# Patient Record
Sex: FEMALE | Race: BLACK OR AFRICAN AMERICAN | NOT HISPANIC OR LATINO | Employment: FULL TIME | URBAN - METROPOLITAN AREA
[De-identification: names, ages, dates, MRNs, and addresses within clinical notes are randomized per-mention and may not be internally consistent; named-entity substitution may affect disease eponyms.]

---

## 2023-02-23 LAB
ALBUMIN (G/DL) IN SER/PLAS: 4.8 G/DL (ref 3.4–5)
ANION GAP IN SER/PLAS: 13 MMOL/L (ref 10–20)
CALCIUM (MG/DL) IN SER/PLAS: 10.7 MG/DL (ref 8.6–10.6)
CARBON DIOXIDE, TOTAL (MMOL/L) IN SER/PLAS: 29 MMOL/L (ref 21–32)
CHLORIDE (MMOL/L) IN SER/PLAS: 104 MMOL/L (ref 98–107)
CREATININE (MG/DL) IN SER/PLAS: 0.79 MG/DL (ref 0.5–1.05)
CREATININE (MG/DL) IN URINE: 99.5 MG/DL (ref 20–320)
ERYTHROCYTE DISTRIBUTION WIDTH (RATIO) BY AUTOMATED COUNT: 14.5 % (ref 11.5–14.5)
ERYTHROCYTE MEAN CORPUSCULAR HEMOGLOBIN CONCENTRATION (G/DL) BY AUTOMATED: 29.8 G/DL (ref 32–36)
ERYTHROCYTE MEAN CORPUSCULAR VOLUME (FL) BY AUTOMATED COUNT: 89 FL (ref 80–100)
ERYTHROCYTES (10*6/UL) IN BLOOD BY AUTOMATED COUNT: 4.6 X10E12/L (ref 4–5.2)
GFR FEMALE: 88 ML/MIN/1.73M2
GLUCOSE (MG/DL) IN SER/PLAS: 94 MG/DL (ref 74–99)
HEMATOCRIT (%) IN BLOOD BY AUTOMATED COUNT: 41 % (ref 36–46)
HEMOGLOBIN (G/DL) IN BLOOD: 12.2 G/DL (ref 12–16)
LEUKOCYTES (10*3/UL) IN BLOOD BY AUTOMATED COUNT: 8.6 X10E9/L (ref 4.4–11.3)
MAGNESIUM (MG/DL) IN SER/PLAS: 2.01 MG/DL (ref 1.6–2.4)
NRBC (PER 100 WBCS) BY AUTOMATED COUNT: 0 /100 WBC (ref 0–0)
PHOSPHATE (MG/DL) IN SER/PLAS: 4.1 MG/DL (ref 2.5–4.9)
PLATELETS (10*3/UL) IN BLOOD AUTOMATED COUNT: 515 X10E9/L (ref 150–450)
POTASSIUM (MMOL/L) IN SER/PLAS: 4.2 MMOL/L (ref 3.5–5.3)
PROTEIN (MG/DL) IN URINE: 11 MG/DL (ref 5–24)
PROTEIN/CREATININE (MG/MG) IN URINE: 0.11 MG/MG CREAT (ref 0–0.17)
SODIUM (MMOL/L) IN SER/PLAS: 142 MMOL/L (ref 136–145)
TACROLIMUS (NG/ML) IN BLOOD: 6.8 NG/ML (ref 2–15)
UREA NITROGEN (MG/DL) IN SER/PLAS: 9 MG/DL (ref 6–23)

## 2023-02-24 LAB
BK VIRUS LOG PCR: NORMAL LOG IU/ML
BK VIRUS PCR QUANT: NOT DETECTED IU/ML
CYTOMEGALOVIRUS DNA, PCR COMMENT: NORMAL
CYTOMEGALOVIRUS DNA, PCR IU/ML: NOT DETECTED IU/ML
CYTOMEGALOVIRUS DNA, PCR LOG IU/ML: NORMAL LOG IU/ML
EBV PCR WHOLE BLD LOG IU/ML: NORMAL LOG IU/ML
EBV PCR, QUANT, WHOLE BLOOD: NOT DETECTED IU/ML

## 2023-03-09 LAB
ALBUMIN (G/DL) IN SER/PLAS: 4.5 G/DL (ref 3.4–5)
ANION GAP IN SER/PLAS: 14 MMOL/L (ref 10–20)
CALCIDIOL (25 OH VITAMIN D3) (NG/ML) IN SER/PLAS: 35 NG/ML
CALCIUM (MG/DL) IN SER/PLAS: 10.5 MG/DL (ref 8.6–10.6)
CARBON DIOXIDE, TOTAL (MMOL/L) IN SER/PLAS: 28 MMOL/L (ref 21–32)
CHLORIDE (MMOL/L) IN SER/PLAS: 100 MMOL/L (ref 98–107)
CREATININE (MG/DL) IN SER/PLAS: 0.79 MG/DL (ref 0.5–1.05)
ERYTHROCYTE DISTRIBUTION WIDTH (RATIO) BY AUTOMATED COUNT: 14.5 % (ref 11.5–14.5)
ERYTHROCYTE MEAN CORPUSCULAR HEMOGLOBIN CONCENTRATION (G/DL) BY AUTOMATED: 30.5 G/DL (ref 32–36)
ERYTHROCYTE MEAN CORPUSCULAR VOLUME (FL) BY AUTOMATED COUNT: 88 FL (ref 80–100)
ERYTHROCYTES (10*6/UL) IN BLOOD BY AUTOMATED COUNT: 4.45 X10E12/L (ref 4–5.2)
GFR FEMALE: 88 ML/MIN/1.73M2
GLUCOSE (MG/DL) IN SER/PLAS: 99 MG/DL (ref 74–99)
HEMATOCRIT (%) IN BLOOD BY AUTOMATED COUNT: 39 % (ref 36–46)
HEMOGLOBIN (G/DL) IN BLOOD: 11.9 G/DL (ref 12–16)
LEUKOCYTES (10*3/UL) IN BLOOD BY AUTOMATED COUNT: 8.4 X10E9/L (ref 4.4–11.3)
MAGNESIUM (MG/DL) IN SER/PLAS: 1.75 MG/DL (ref 1.6–2.4)
NRBC (PER 100 WBCS) BY AUTOMATED COUNT: 0 /100 WBC (ref 0–0)
PHOSPHATE (MG/DL) IN SER/PLAS: 4.2 MG/DL (ref 2.5–4.9)
PLATELETS (10*3/UL) IN BLOOD AUTOMATED COUNT: 506 X10E9/L (ref 150–450)
POTASSIUM (MMOL/L) IN SER/PLAS: 4.3 MMOL/L (ref 3.5–5.3)
SODIUM (MMOL/L) IN SER/PLAS: 138 MMOL/L (ref 136–145)
TACROLIMUS (NG/ML) IN BLOOD: 6 NG/ML (ref 2–15)
UREA NITROGEN (MG/DL) IN SER/PLAS: 9 MG/DL (ref 6–23)

## 2023-03-10 LAB
EBV PCR PLASMA LOG IU/ML: NORMAL LOG IU/ML
EBV PCR, QUANT, PLASMA: NOT DETECTED IU/ML

## 2023-04-06 ENCOUNTER — APPOINTMENT (OUTPATIENT)
Dept: LAB | Facility: LAB | Age: 58
End: 2023-04-06
Payer: COMMERCIAL

## 2023-04-06 LAB
ALBUMIN (G/DL) IN SER/PLAS: 4.1 G/DL (ref 3.4–5)
ANION GAP IN SER/PLAS: 15 MMOL/L (ref 10–20)
CALCIUM (MG/DL) IN SER/PLAS: 9.8 MG/DL (ref 8.6–10.6)
CARBON DIOXIDE, TOTAL (MMOL/L) IN SER/PLAS: 26 MMOL/L (ref 21–32)
CHLORIDE (MMOL/L) IN SER/PLAS: 104 MMOL/L (ref 98–107)
CREATININE (MG/DL) IN SER/PLAS: 0.81 MG/DL (ref 0.5–1.05)
CREATININE (MG/DL) IN URINE: 77.8 MG/DL (ref 20–320)
ERYTHROCYTE DISTRIBUTION WIDTH (RATIO) BY AUTOMATED COUNT: 14.1 % (ref 11.5–14.5)
ERYTHROCYTE MEAN CORPUSCULAR HEMOGLOBIN CONCENTRATION (G/DL) BY AUTOMATED: 29.4 G/DL (ref 32–36)
ERYTHROCYTE MEAN CORPUSCULAR VOLUME (FL) BY AUTOMATED COUNT: 90 FL (ref 80–100)
ERYTHROCYTES (10*6/UL) IN BLOOD BY AUTOMATED COUNT: 4.21 X10E12/L (ref 4–5.2)
GFR FEMALE: 85 ML/MIN/1.73M2
GLUCOSE (MG/DL) IN SER/PLAS: 90 MG/DL (ref 74–99)
HEMATOCRIT (%) IN BLOOD BY AUTOMATED COUNT: 37.8 % (ref 36–46)
HEMOGLOBIN (G/DL) IN BLOOD: 11.1 G/DL (ref 12–16)
LEUKOCYTES (10*3/UL) IN BLOOD BY AUTOMATED COUNT: 8.3 X10E9/L (ref 4.4–11.3)
MAGNESIUM (MG/DL) IN SER/PLAS: 1.76 MG/DL (ref 1.6–2.4)
NRBC (PER 100 WBCS) BY AUTOMATED COUNT: 0 /100 WBC (ref 0–0)
PHOSPHATE (MG/DL) IN SER/PLAS: 4.2 MG/DL (ref 2.5–4.9)
PLATELETS (10*3/UL) IN BLOOD AUTOMATED COUNT: 464 X10E9/L (ref 150–450)
POTASSIUM (MMOL/L) IN SER/PLAS: 4.6 MMOL/L (ref 3.5–5.3)
PROTEIN (MG/DL) IN URINE: 9 MG/DL (ref 5–24)
PROTEIN/CREATININE (MG/MG) IN URINE: 0.12 MG/MG CREAT (ref 0–0.17)
SODIUM (MMOL/L) IN SER/PLAS: 140 MMOL/L (ref 136–145)
TACROLIMUS (NG/ML) IN BLOOD: 7.3 NG/ML (ref 2–15)
UREA NITROGEN (MG/DL) IN SER/PLAS: 15 MG/DL (ref 6–23)

## 2023-04-07 LAB
BK VIRUS LOG PCR: NORMAL LOG IU/ML
BK VIRUS PCR QUANT: NOT DETECTED IU/ML
CYTOMEGALOVIRUS DNA, PCR COMMENT: NORMAL
CYTOMEGALOVIRUS DNA, PCR IU/ML: NOT DETECTED IU/ML
CYTOMEGALOVIRUS DNA, PCR LOG IU/ML: NORMAL LOG IU/ML
EBV PCR PLASMA LOG IU/ML: NORMAL LOG IU/ML
EBV PCR, QUANT, PLASMA: NOT DETECTED IU/ML

## 2023-05-09 LAB
CREATININE (MG/DL) IN URINE: 84.1 MG/DL (ref 20–320)
EBV PCR PLASMA LOG IU/ML: NORMAL
EBV PCR, QUANT, PLASMA: NORMAL
PROTEIN (MG/DL) IN URINE: 15 MG/DL (ref 5–24)
PROTEIN/CREATININE (MG/MG) IN URINE: 0.18 MG/MG CREAT (ref 0–0.17)

## 2023-05-11 LAB
ALBUMIN (G/DL) IN SER/PLAS: 4.4 G/DL (ref 3.4–5)
ANION GAP IN SER/PLAS: 12 MMOL/L (ref 10–20)
CALCIUM (MG/DL) IN SER/PLAS: 10.4 MG/DL (ref 8.6–10.6)
CARBON DIOXIDE, TOTAL (MMOL/L) IN SER/PLAS: 28 MMOL/L (ref 21–32)
CHLORIDE (MMOL/L) IN SER/PLAS: 103 MMOL/L (ref 98–107)
CREATININE (MG/DL) IN SER/PLAS: 0.8 MG/DL (ref 0.5–1.05)
CREATININE (MG/DL) IN URINE: NORMAL
ERYTHROCYTE DISTRIBUTION WIDTH (RATIO) BY AUTOMATED COUNT: 13.7 % (ref 11.5–14.5)
ERYTHROCYTE MEAN CORPUSCULAR HEMOGLOBIN CONCENTRATION (G/DL) BY AUTOMATED: 30.6 G/DL (ref 32–36)
ERYTHROCYTE MEAN CORPUSCULAR VOLUME (FL) BY AUTOMATED COUNT: 87 FL (ref 80–100)
ERYTHROCYTES (10*6/UL) IN BLOOD BY AUTOMATED COUNT: 4.34 X10E12/L (ref 4–5.2)
GFR FEMALE: 87 ML/MIN/1.73M2
GLUCOSE (MG/DL) IN SER/PLAS: 103 MG/DL (ref 74–99)
HEMATOCRIT (%) IN BLOOD BY AUTOMATED COUNT: 37.9 % (ref 36–46)
HEMOGLOBIN (G/DL) IN BLOOD: 11.6 G/DL (ref 12–16)
LEUKOCYTES (10*3/UL) IN BLOOD BY AUTOMATED COUNT: 7.9 X10E9/L (ref 4.4–11.3)
MAGNESIUM (MG/DL) IN SER/PLAS: 1.78 MG/DL (ref 1.6–2.4)
NRBC (PER 100 WBCS) BY AUTOMATED COUNT: 0 /100 WBC (ref 0–0)
PHOSPHATE (MG/DL) IN SER/PLAS: 3.7 MG/DL (ref 2.5–4.9)
PLATELETS (10*3/UL) IN BLOOD AUTOMATED COUNT: 487 X10E9/L (ref 150–450)
POTASSIUM (MMOL/L) IN SER/PLAS: 4.3 MMOL/L (ref 3.5–5.3)
PROTEIN (MG/DL) IN URINE: NORMAL
PROTEIN/CREATININE (MG/MG) IN URINE: NORMAL
SODIUM (MMOL/L) IN SER/PLAS: 139 MMOL/L (ref 136–145)
TACROLIMUS (NG/ML) IN BLOOD: 5.9 NG/ML (ref 2–15)
UREA NITROGEN (MG/DL) IN SER/PLAS: 14 MG/DL (ref 6–23)

## 2023-05-12 LAB
BK VIRUS LOG PCR: NORMAL LOG IU/ML
BK VIRUS PCR QUANT: NOT DETECTED IU/ML
EBV PCR PLASMA LOG IU/ML: NORMAL LOG IU/ML
EBV PCR, QUANT, PLASMA: NOT DETECTED IU/ML

## 2023-06-15 ENCOUNTER — APPOINTMENT (OUTPATIENT)
Dept: LAB | Facility: LAB | Age: 58
End: 2023-06-15
Payer: COMMERCIAL

## 2023-06-15 LAB
ALBUMIN (G/DL) IN SER/PLAS: 4.4 G/DL (ref 3.4–5)
ANION GAP IN SER/PLAS: 13 MMOL/L (ref 10–20)
CALCIUM (MG/DL) IN SER/PLAS: 10.4 MG/DL (ref 8.6–10.6)
CARBON DIOXIDE, TOTAL (MMOL/L) IN SER/PLAS: 26 MMOL/L (ref 21–32)
CHLORIDE (MMOL/L) IN SER/PLAS: 105 MMOL/L (ref 98–107)
CREATININE (MG/DL) IN SER/PLAS: 0.9 MG/DL (ref 0.5–1.05)
ERYTHROCYTE DISTRIBUTION WIDTH (RATIO) BY AUTOMATED COUNT: 14.2 % (ref 11.5–14.5)
ERYTHROCYTE MEAN CORPUSCULAR HEMOGLOBIN CONCENTRATION (G/DL) BY AUTOMATED: 30.3 G/DL (ref 32–36)
ERYTHROCYTE MEAN CORPUSCULAR VOLUME (FL) BY AUTOMATED COUNT: 87 FL (ref 80–100)
ERYTHROCYTES (10*6/UL) IN BLOOD BY AUTOMATED COUNT: 4.32 X10E12/L (ref 4–5.2)
GFR FEMALE: 75 ML/MIN/1.73M2
GLUCOSE (MG/DL) IN SER/PLAS: 113 MG/DL (ref 74–99)
HEMATOCRIT (%) IN BLOOD BY AUTOMATED COUNT: 37.6 % (ref 36–46)
HEMOGLOBIN (G/DL) IN BLOOD: 11.4 G/DL (ref 12–16)
LEUKOCYTES (10*3/UL) IN BLOOD BY AUTOMATED COUNT: 6.7 X10E9/L (ref 4.4–11.3)
MAGNESIUM (MG/DL) IN SER/PLAS: 1.81 MG/DL (ref 1.6–2.4)
NRBC (PER 100 WBCS) BY AUTOMATED COUNT: 0 /100 WBC (ref 0–0)
PHOSPHATE (MG/DL) IN SER/PLAS: 4 MG/DL (ref 2.5–4.9)
PLATELETS (10*3/UL) IN BLOOD AUTOMATED COUNT: 461 X10E9/L (ref 150–450)
POTASSIUM (MMOL/L) IN SER/PLAS: 4.2 MMOL/L (ref 3.5–5.3)
SODIUM (MMOL/L) IN SER/PLAS: 140 MMOL/L (ref 136–145)
TACROLIMUS (NG/ML) IN BLOOD: 7.4 NG/ML (ref 2–15)
UREA NITROGEN (MG/DL) IN SER/PLAS: 11 MG/DL (ref 6–23)

## 2023-07-11 PROBLEM — H52.4 ASTIGMATISM OF BOTH EYES WITH PRESBYOPIA: Status: ACTIVE | Noted: 2023-07-11

## 2023-07-11 PROBLEM — D64.9 ANEMIA: Status: ACTIVE | Noted: 2023-07-11

## 2023-07-11 PROBLEM — E83.39 HYPOPHOSPHATEMIA: Status: ACTIVE | Noted: 2023-07-11

## 2023-07-11 PROBLEM — R79.89 INCREASED PLATELET COUNT: Status: ACTIVE | Noted: 2023-07-11

## 2023-07-11 PROBLEM — H52.203 ASTIGMATISM OF BOTH EYES WITH PRESBYOPIA: Status: ACTIVE | Noted: 2023-07-11

## 2023-07-11 PROBLEM — H52.03 HYPERMETROPIA OF BOTH EYES: Status: ACTIVE | Noted: 2023-07-11

## 2023-07-11 PROBLEM — D84.9 IMMUNOSUPPRESSION (MULTI): Status: ACTIVE | Noted: 2023-07-11

## 2023-07-11 PROBLEM — E78.5 DYSLIPIDEMIA: Status: ACTIVE | Noted: 2017-02-01

## 2023-07-11 PROBLEM — N25.81 SECONDARY HYPERPARATHYROIDISM, RENAL (MULTI): Status: ACTIVE | Noted: 2023-07-11

## 2023-07-11 PROBLEM — Z94.0 KIDNEY REPLACED BY TRANSPLANT (HHS-HCC): Status: ACTIVE | Noted: 2023-07-11

## 2023-07-11 PROBLEM — E83.42 HYPOMAGNESEMIA: Status: ACTIVE | Noted: 2023-07-11

## 2023-07-11 PROBLEM — H40.003 GLAUCOMA SUSPECT, BOTH EYES: Status: ACTIVE | Noted: 2023-07-11

## 2023-07-11 PROBLEM — K52.1 DIARRHEA DUE TO DRUG: Status: ACTIVE | Noted: 2023-07-11

## 2023-07-11 PROBLEM — D72.829 LEUKOCYTOSIS: Status: ACTIVE | Noted: 2023-07-11

## 2023-07-11 PROBLEM — N89.8 VAGINAL DISCHARGE: Status: ACTIVE | Noted: 2023-07-11

## 2023-07-11 PROBLEM — E55.9 VITAMIN D INSUFFICIENCY: Status: ACTIVE | Noted: 2023-07-11

## 2023-07-11 RX ORDER — AMLODIPINE BESYLATE 10 MG/1
10 TABLET ORAL DAILY
COMMUNITY
End: 2023-07-12 | Stop reason: SDUPTHER

## 2023-07-11 RX ORDER — TACROLIMUS 1 MG/1
1 CAPSULE ORAL EVERY 12 HOURS
COMMUNITY
End: 2024-03-19 | Stop reason: SDUPTHER

## 2023-07-11 RX ORDER — ASPIRIN 81 MG/1
1 TABLET ORAL DAILY
COMMUNITY
Start: 2022-03-18

## 2023-07-11 RX ORDER — MYCOPHENOLATE MOFETIL 250 MG/1
CAPSULE ORAL EVERY 12 HOURS
COMMUNITY
Start: 2022-03-09 | End: 2023-10-26 | Stop reason: ALTCHOICE

## 2023-07-11 RX ORDER — LANOLIN ALCOHOL/MO/W.PET/CERES
1 CREAM (GRAM) TOPICAL 2 TIMES DAILY
COMMUNITY
Start: 2023-06-03 | End: 2023-10-26 | Stop reason: SDUPTHER

## 2023-07-11 RX ORDER — PANTOPRAZOLE SODIUM 40 MG/1
1 TABLET, DELAYED RELEASE ORAL DAILY
COMMUNITY
Start: 2022-03-14

## 2023-07-11 RX ORDER — VENLAFAXINE 37.5 MG/1
1 TABLET ORAL DAILY
COMMUNITY
Start: 2022-10-26 | End: 2024-01-04 | Stop reason: ALTCHOICE

## 2023-07-11 RX ORDER — TACROLIMUS 0.5 MG/1
0.5 CAPSULE ORAL EVERY 12 HOURS
COMMUNITY
End: 2024-03-19 | Stop reason: SDUPTHER

## 2023-07-11 RX ORDER — DOCUSATE SODIUM 100 MG/1
CAPSULE, LIQUID FILLED ORAL 2 TIMES DAILY PRN
COMMUNITY
Start: 2022-03-14 | End: 2023-10-26 | Stop reason: ALTCHOICE

## 2023-07-11 RX ORDER — VALGANCICLOVIR 450 MG/1
TABLET, FILM COATED ORAL EVERY 24 HOURS
COMMUNITY
Start: 2022-03-14 | End: 2024-01-04 | Stop reason: ALTCHOICE

## 2023-07-11 RX ORDER — CARVEDILOL 6.25 MG/1
TABLET ORAL 2 TIMES DAILY
COMMUNITY
Start: 2022-05-12

## 2023-07-11 RX ORDER — PREDNISONE 5 MG/1
1 TABLET ORAL DAILY
COMMUNITY
Start: 2022-03-09 | End: 2023-10-26 | Stop reason: ALTCHOICE

## 2023-07-11 RX ORDER — ACETAMINOPHEN 500 MG
1 TABLET ORAL DAILY
COMMUNITY
Start: 2022-09-14 | End: 2023-10-26 | Stop reason: SDUPTHER

## 2023-07-11 RX ORDER — HYDRALAZINE HYDROCHLORIDE 25 MG/1
TABLET, FILM COATED ORAL EVERY 12 HOURS
COMMUNITY
Start: 2022-03-14 | End: 2023-10-26 | Stop reason: ALTCHOICE

## 2023-07-11 RX ORDER — ACETAMINOPHEN 325 MG/1
TABLET ORAL EVERY 6 HOURS PRN
COMMUNITY
Start: 2022-03-14

## 2023-07-12 ENCOUNTER — LAB (OUTPATIENT)
Dept: LAB | Facility: LAB | Age: 58
End: 2023-07-12
Payer: COMMERCIAL

## 2023-07-12 ENCOUNTER — OFFICE VISIT (OUTPATIENT)
Dept: PRIMARY CARE | Facility: CLINIC | Age: 58
End: 2023-07-12
Payer: COMMERCIAL

## 2023-07-12 VITALS
DIASTOLIC BLOOD PRESSURE: 70 MMHG | SYSTOLIC BLOOD PRESSURE: 112 MMHG | RESPIRATION RATE: 17 BRPM | WEIGHT: 157.2 LBS | TEMPERATURE: 97.7 F | HEIGHT: 59 IN | BODY MASS INDEX: 31.69 KG/M2 | HEART RATE: 86 BPM | OXYGEN SATURATION: 98 %

## 2023-07-12 DIAGNOSIS — I10 ESSENTIAL HYPERTENSION: Primary | ICD-10-CM

## 2023-07-12 DIAGNOSIS — I10 ESSENTIAL HYPERTENSION: ICD-10-CM

## 2023-07-12 DIAGNOSIS — E78.5 DYSLIPIDEMIA: ICD-10-CM

## 2023-07-12 LAB
CHOLESTEROL (MG/DL) IN SER/PLAS: 211 MG/DL (ref 0–199)
CHOLESTEROL IN HDL (MG/DL) IN SER/PLAS: 77.8 MG/DL
CHOLESTEROL IN LDL (MG/DL) IN SER/PLAS BY DIRECT ASSAY: 104 MG/DL (ref 0–129)
CHOLESTEROL/HDL RATIO: 2.7
NON-HDL CHOLESTEROL: 133 MG/DL
THYROTROPIN (MIU/L) IN SER/PLAS BY DETECTION LIMIT <= 0.05 MIU/L: 1 MIU/L (ref 0.44–3.98)

## 2023-07-12 PROCEDURE — 3074F SYST BP LT 130 MM HG: CPT | Performed by: FAMILY MEDICINE

## 2023-07-12 PROCEDURE — 3078F DIAST BP <80 MM HG: CPT | Performed by: FAMILY MEDICINE

## 2023-07-12 PROCEDURE — 84443 ASSAY THYROID STIM HORMONE: CPT

## 2023-07-12 PROCEDURE — 99213 OFFICE O/P EST LOW 20 MIN: CPT | Performed by: FAMILY MEDICINE

## 2023-07-12 PROCEDURE — 82465 ASSAY BLD/SERUM CHOLESTEROL: CPT

## 2023-07-12 PROCEDURE — 1036F TOBACCO NON-USER: CPT | Performed by: FAMILY MEDICINE

## 2023-07-12 PROCEDURE — 83718 ASSAY OF LIPOPROTEIN: CPT

## 2023-07-12 PROCEDURE — 83721 ASSAY OF BLOOD LIPOPROTEIN: CPT

## 2023-07-12 PROCEDURE — 36415 COLL VENOUS BLD VENIPUNCTURE: CPT

## 2023-07-12 RX ORDER — AMLODIPINE BESYLATE 10 MG/1
10 TABLET ORAL DAILY
Qty: 90 TABLET | Refills: 3 | Status: SHIPPED | OUTPATIENT
Start: 2023-07-12 | End: 2024-03-28

## 2023-07-12 ASSESSMENT — PATIENT HEALTH QUESTIONNAIRE - PHQ9
1. LITTLE INTEREST OR PLEASURE IN DOING THINGS: NOT AT ALL
SUM OF ALL RESPONSES TO PHQ9 QUESTIONS 1 AND 2: 0
2. FEELING DOWN, DEPRESSED OR HOPELESS: NOT AT ALL

## 2023-07-12 ASSESSMENT — LIFESTYLE VARIABLES: HOW MANY STANDARD DRINKS CONTAINING ALCOHOL DO YOU HAVE ON A TYPICAL DAY: PATIENT DOES NOT DRINK

## 2023-07-12 NOTE — PATIENT INSTRUCTIONS
HTN  - Your blood pressure is at goal today- goal is less than 130/80  - Continue your current medications  - Weight loss can help lower your bp!  Work on a healthy whole food diet and add at least 30min of exercise 5 days per week  - Work on a low salt diet     Please follow up in 6months for Wellness or as needed.       ** If labs or imaging ordered at today's visit, all the non-urgent results will be discussed at your next visit    If you have been referred for a special test or to a specialist please call  [phone] to schedule an appointment.  If you have any further questions, or if develop new or worsened symptoms, please give our office a call at [phone].

## 2023-07-12 NOTE — PROGRESS NOTES
"Subjective   Patient ID: Farzana De La Cruz is a 57 y.o. female who presents for Follow-up (Pt is here for HTN fuv.).    HPI     HTN  BP at goal today in office.  Using medications without issues.  Denies CP, SOB, palpitations, change in vision, dizziness, N/V.    Review of Systems  All systems reviewed and neg if not noted in the HPI above     Objective   /70 (Patient Position: Sitting)   Pulse 86   Temp 36.5 °C (97.7 °F)   Resp 17   Ht 1.499 m (4' 11\")   Wt 71.3 kg (157 lb 3.2 oz)   SpO2 98%   BMI 31.75 kg/m²     Physical Exam  Pleasant  Eyes: conjunctiva non-icteric and eye lids are without obvious rash or drooping. Pupils are symmetric.   Ears, Nose, Mouth, and Throat: External ears and nose appear to be without deformity or rash. No lesions or masses noted. Hearing is grossly intact.   CV: RRR, no murmur  Carotids: no bruits  Pulm:CTA B/L  Abd: soft, NTTP, + BS  LE: no edema  Psychiatric: Alert, orientation to person, place, and time. Recent/remote memory as evidenced through face-to-face interaction and discussion appear grossly intact. Mood and affect are normal.      Assessment/Plan   Problem List Items Addressed This Visit       Dyslipidemia    Relevant Orders    TSH with reflex to Free T4 if abnormal    Lipid Panel Non-Fasting    Cholesterol, LDL Direct    Essential hypertension - Primary    Relevant Medications    amLODIPine (Norvasc) 10 mg tablet    Other Relevant Orders    TSH with reflex to Free T4 if abnormal    Lipid Panel Non-Fasting    Cholesterol, LDL Direct     Please follow up in 6months for Wellness or as needed.      "

## 2023-08-17 LAB
ALBUMIN (G/DL) IN SER/PLAS: 4.3 G/DL (ref 3.4–5)
ANION GAP IN SER/PLAS: 12 MMOL/L (ref 10–20)
CALCIUM (MG/DL) IN SER/PLAS: 10 MG/DL (ref 8.6–10.6)
CARBON DIOXIDE, TOTAL (MMOL/L) IN SER/PLAS: 29 MMOL/L (ref 21–32)
CHLORIDE (MMOL/L) IN SER/PLAS: 104 MMOL/L (ref 98–107)
CREATININE (MG/DL) IN SER/PLAS: 0.84 MG/DL (ref 0.5–1.05)
CREATININE (MG/DL) IN URINE: 124 MG/DL (ref 20–320)
ERYTHROCYTE DISTRIBUTION WIDTH (RATIO) BY AUTOMATED COUNT: 15.1 % (ref 11.5–14.5)
ERYTHROCYTE MEAN CORPUSCULAR HEMOGLOBIN CONCENTRATION (G/DL) BY AUTOMATED: 29.4 G/DL (ref 32–36)
ERYTHROCYTE MEAN CORPUSCULAR VOLUME (FL) BY AUTOMATED COUNT: 90 FL (ref 80–100)
ERYTHROCYTES (10*6/UL) IN BLOOD BY AUTOMATED COUNT: 4.48 X10E12/L (ref 4–5.2)
GFR FEMALE: 82 ML/MIN/1.73M2
GLUCOSE (MG/DL) IN SER/PLAS: 98 MG/DL (ref 74–99)
HEMATOCRIT (%) IN BLOOD BY AUTOMATED COUNT: 40.2 % (ref 36–46)
HEMOGLOBIN (G/DL) IN BLOOD: 11.8 G/DL (ref 12–16)
LEUKOCYTES (10*3/UL) IN BLOOD BY AUTOMATED COUNT: 8 X10E9/L (ref 4.4–11.3)
MAGNESIUM (MG/DL) IN SER/PLAS: 1.95 MG/DL (ref 1.6–2.4)
NRBC (PER 100 WBCS) BY AUTOMATED COUNT: 0 /100 WBC (ref 0–0)
PHOSPHATE (MG/DL) IN SER/PLAS: 3.8 MG/DL (ref 2.5–4.9)
PLATELETS (10*3/UL) IN BLOOD AUTOMATED COUNT: 490 X10E9/L (ref 150–450)
POTASSIUM (MMOL/L) IN SER/PLAS: 4.5 MMOL/L (ref 3.5–5.3)
PROTEIN (MG/DL) IN URINE: 14 MG/DL (ref 5–24)
PROTEIN/CREATININE (MG/MG) IN URINE: 0.11 MG/MG CREAT (ref 0–0.17)
SODIUM (MMOL/L) IN SER/PLAS: 140 MMOL/L (ref 136–145)
TACROLIMUS (NG/ML) IN BLOOD: 11.2 NG/ML (ref 2–15)
UREA NITROGEN (MG/DL) IN SER/PLAS: 12 MG/DL (ref 6–23)

## 2023-08-18 LAB
BK VIRUS LOG PCR: NORMAL LOG IU/ML
BK VIRUS PCR QUANT: NOT DETECTED IU/ML

## 2023-08-21 LAB — TACROLIMUS (NG/ML) IN BLOOD: 6.9 NG/ML (ref 2–15)

## 2023-09-15 PROBLEM — D22.5 MELANOCYTIC NEVI OF TRUNK: Status: ACTIVE | Noted: 2023-08-02

## 2023-09-15 PROBLEM — L57.9 SKIN CHANGES DUE TO CHRONIC EXPOSURE TO NONIONIZING RADIATION, UNSPECIFIED: Status: ACTIVE | Noted: 2023-08-02

## 2023-09-15 PROBLEM — H40.059 OHT (OCULAR HYPERTENSION): Status: ACTIVE | Noted: 2023-09-15

## 2023-09-15 RX ORDER — LISINOPRIL AND HYDROCHLOROTHIAZIDE 12.5; 2 MG/1; MG/1
1 TABLET ORAL EVERY MORNING
COMMUNITY
Start: 2014-07-05 | End: 2023-10-26 | Stop reason: ALTCHOICE

## 2023-09-15 RX ORDER — HYDROCORTISONE 25 MG/G
CREAM TOPICAL
COMMUNITY
Start: 2023-08-02 | End: 2023-10-26 | Stop reason: ALTCHOICE

## 2023-10-05 ENCOUNTER — OFFICE VISIT (OUTPATIENT)
Dept: OPHTHALMOLOGY | Facility: CLINIC | Age: 58
End: 2023-10-05
Payer: COMMERCIAL

## 2023-10-05 DIAGNOSIS — H40.001 GLAUCOMA SUSPECT OF RIGHT EYE: Primary | ICD-10-CM

## 2023-10-05 DIAGNOSIS — H40.002 GLAUCOMA SUSPECT OF LEFT EYE: ICD-10-CM

## 2023-10-05 PROCEDURE — 76514 ECHO EXAM OF EYE THICKNESS: CPT | Performed by: OPHTHALMOLOGY

## 2023-10-05 PROCEDURE — 92020 GONIOSCOPY: CPT | Performed by: OPHTHALMOLOGY

## 2023-10-05 PROCEDURE — 99214 OFFICE O/P EST MOD 30 MIN: CPT | Performed by: OPHTHALMOLOGY

## 2023-10-05 ASSESSMENT — REFRACTION_WEARINGRX
OD_SPHERE: +1.75
OD_CYLINDER: -1.25
OD_ADD: +2.50
OS_ADD: +2.50
OD_AXIS: 080
OS_AXIS: 095
OS_CYLINDER: -1.50
OS_SPHERE: +1.75

## 2023-10-05 ASSESSMENT — VISUAL ACUITY
METHOD: SNELLEN - LINEAR
OS_CC: 20/20-1
OD_CC+: -2
OD_CC: 20/20

## 2023-10-05 ASSESSMENT — GONIOSCOPY
OD_INFERIOR: C40F 1+
OD_NASAL: C40F 1+
OS_SUPERIOR: C40F 1+
OS_INFERIOR: C40F 1+
OD_TEMPORAL: C40F 1+
OD_SUPERIOR: C40F 1+
OS_TEMPORAL: C40F 1+
OS_NASAL: C40F 1+

## 2023-10-05 ASSESSMENT — CUP TO DISC RATIO
OD_RATIO: 0.55
OS_RATIO: 0.65

## 2023-10-05 ASSESSMENT — EXTERNAL EXAM - LEFT EYE: OS_EXAM: NORMAL

## 2023-10-05 ASSESSMENT — ENCOUNTER SYMPTOMS
PSYCHIATRIC NEGATIVE: 0
ENDOCRINE NEGATIVE: 0
HEMATOLOGIC/LYMPHATIC NEGATIVE: 0
GASTROINTESTINAL NEGATIVE: 0
EYES NEGATIVE: 0
RESPIRATORY NEGATIVE: 0
ALLERGIC/IMMUNOLOGIC NEGATIVE: 0
NEUROLOGICAL NEGATIVE: 0
CARDIOVASCULAR NEGATIVE: 0
MUSCULOSKELETAL NEGATIVE: 0
CONSTITUTIONAL NEGATIVE: 0

## 2023-10-05 ASSESSMENT — TONOMETRY
OS_IOP_MMHG: 21
IOP_METHOD: GOLDMANN APPLANATION
OD_IOP_MMHG: 21

## 2023-10-05 ASSESSMENT — PACHYMETRY
OD_CT(UM): 573
OS_CT(UM): 608

## 2023-10-05 ASSESSMENT — EXTERNAL EXAM - RIGHT EYE: OD_EXAM: NORMAL

## 2023-10-05 ASSESSMENT — SLIT LAMP EXAM - LIDS
COMMENTS: NORMAL
COMMENTS: NORMAL

## 2023-10-05 NOTE — PROGRESS NOTES
History    Chief Complaint    Glaucoma       HPI       Glaucoma    In both eyes.  Vision is blurred.  Side effects of treatment include none.  Treatment compliance is always.             Comments    57 year old female present for Glaucoma NPV referred by Dr. Wells  pt highest tmax 25, pt has a family hx of Glaucoma Grandfather (maternal), pt denies any trauma to OU. Pt has been following Dr. Low for Glaucoma since 2021. Pt currently not using any gtts.          Last edited by Kelsey Wolf on 10/5/2023  3:24 PM.            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 11/24/2021    MRSA carrier    Chronic kidney disease, stage 4 (severe) (CMS/[facility]) 01/21/2022    CKD stage G4/A3, GFR 15-29 and albumin creatinine ratio >300 mg/g    Chronic kidney disease, stage 5 (CMS/[facility]) 04/07/2022    CKD (chronic kidney disease) stage 5, GFR less than 15 ml/min    Cramp and spasm 10/07/2020    Nocturnal muscle cramp    Dependence on renal dialysis (CMS/[facility]) 01/21/2022    Peritoneal dialysis status    Drug induced constipation 03/09/2022    Drug-induced constipation    Encounter for general adult medical examination without abnormal findings 07/25/2017    Well adult exam    Encounter for other preprocedural examination 03/09/2022    Pre-transplant evaluation for kidney transplant    Encounter for other preprocedural examination 03/09/2022    Preop testing    Encounter for other screening for malignant neoplasm of breast  2022    Breast cancer screening    Encounter for pregnancy test, result negative 2022    Urine pregnancy test negative    End stage renal disease (CMS/HCC) 2022    ESRD on dialysis    Essential (primary) hypertension 10/26/2022    Hypertension    Other acute postprocedural pain 2022    Post-operative pain    Other disorders of phosphorus metabolism 2021    Hyperphosphatemia    Other specified counseling 2018    Grief counseling    Pain in right leg 2019    Pain of right lower extremity    Personal history of other diseases of the digestive system 2022    History of constipation    Personal history of other medical treatment 06/10/2019    History of screening mammography    Shortness of breath 2022    SOB (shortness of breath) on exertion    Unspecified astigmatism, bilateral 2022    Astigmatism of both eyes     Past Surgical History:   Procedure Laterality Date     SECTION, CLASSIC  2018     Section    MOUTH SURGERY  2018    Oral Surgery Tooth Extraction    OTHER SURGICAL HISTORY  2017    Conclusion Of Operation Implants Mesh    OTHER SURGICAL HISTORY  2022    Kidney transplantation    TUBAL LIGATION  2018    Tubal Ligation     SOCIAL HISTORY   SMOKING:  reports that she has never smoked. She has never used smokeless tobacco.  DRUG USE:    reports no history of drug use.    FAMILY HISTORY  family history includes Glaucoma in her maternal grandfather.    CURRENT MEDICATIONS  No current outpatient medications on file. (Ophthalmology pharm classes)       Current Outpatient Medications (Other)   Medication Sig Dispense Refill    acetaminophen (Tylenol) 325 mg tablet Take by mouth every 6 hours if needed.      amLODIPine (Norvasc) 10 mg tablet Take 1 tablet (10 mg) by mouth once daily. as directed 90 tablet 3    aspirin 81 mg EC tablet Take 1 tablet (81 mg) by mouth once daily.      carvedilol (Coreg) 6.25 mg tablet  Take by mouth twice a day.      cholecalciferol (Vitamin D-3) 50 mcg (2,000 unit) capsule Take 1 capsule (50 mcg) by mouth once daily.      docusate sodium (Colace) 100 mg capsule Take by mouth 2 times a day as needed.      hydrALAZINE (Apresoline) 25 mg tablet Take by mouth every 12 hours.      hydrocortisone 2.5 % cream 1 Application      lisinopriL-hydrochlorothiazide 20-12.5 mg tablet Take 1 tablet by mouth once daily in the morning.      magnesium oxide (Mag-Ox) 400 mg (241.3 mg magnesium) tablet Take 1 tablet (400 mg) by mouth 2 times a day.      mycophenolate (Cellcept) 250 mg capsule Take by mouth every 12 hours.      mycophenolate (Cellcept) 250 mg capsule TAKE THREE (3) CAPSULES BY MOUTH EVERY 12 HOURS. 180 capsule 11    pantoprazole (ProtoNix) 40 mg EC tablet Take 1 tablet (40 mg) by mouth once daily.      predniSONE (Deltasone) 5 mg tablet Take 1 tablet (5 mg) by mouth once daily.      predniSONE (Deltasone) 5 mg tablet TAKE ONE (1) TABLET BY MOUTH ONCE DAILY. 30 tablet 11    tacrolimus (Prograf) 0.5 mg capsule Take 1 capsule (0.5 mg) by mouth every 12 hours.      tacrolimus (Prograf) 1 mg capsule Take 1 capsule (1 mg) by mouth every 12 hours.      valGANciclovir (Valcyte) 450 mg tablet Take by mouth once every 24 hours.      venlafaxine (Effexor) 37.5 mg tablet Take 1 tablet (37.5 mg) by mouth once daily.         Exam   Visual Acuity (Snellen - Linear)         Right Left    Dist cc 20/20 -2 20/20-1              Edited by: Kelsey Wolf              Wearing Rx       Wearing Rx         Sphere Cylinder Axis Add    Right +1.75 -1.25 080 +2.50    Left +1.75 -1.50 095 +2.50                  Not recorded       Not recorded       Intraocular pressure was 21 in the right eye and 21 in the left eye using Goldmann Applanation.  Not recorded       Pachymetry       Pachymetry (10/5/2023)         Right Left    Thickness 573 608                  Gonioscopy       Gonioscopy         Right Left    Temporal c40f 1+  "c40f 1+    Nasal c40f 1+ c40f 1+    Superior c40f 1+ c40f 1+    Inferior c40f 1+ c40f 1+                   External Exam         Right Left    External Normal Normal              Slit Lamp Exam         Right Left    Lids/Lashes Normal Normal    Conjunctiva/Sclera White and quiet White and quiet    Cornea Clear Clear    Anterior Chamber Deep and quiet Deep and quiet    Iris Round and reactive Round and reactive    Lens Clear Clear    Anterior Vitreous Normal Normal              Fundus Exam         Right Left    Disc Normal, even rims Normal, even rims    C/D Ratio 0.55 0.65    Macula Normal Normal    Vessels Normal Normal    Periphery Normal Normal                   <div id=\"MAIN_[email or web address]\"></div>       Diagnostics          Plan   -  The primary encounter diagnosis was Glaucoma suspect of right eye. A diagnosis of Glaucoma suspect of left eye was also pertinent to this visit.  -  Referred By:  No ref. provider found  -  IOP:  Last Tonometry OD 21 / OS 21 /Date 3:31 PM      Target OD: No Value exists for the : EPIC#QDC961 Target OS: No Value exists for the : EPIC#[id number]       Max pressure OD:   Date:         Max Pressure OS:   Date:    -    Gonioscopy       Gonioscopy         Right Left    Temporal c40f 1+ c40f 1+    Nasal c40f 1+ c40f 1+    Superior c40f 1+ c40f 1+    Inferior c40f 1+ c40f 1+                    -    Pachymetry       Pachymetry (10/5/2023)         Right Left    Thickness 573 608                  -  Pathophysiology of glaucoma, and potential blinding nature of disease reviewed.      Importance of follow up and compliance stressed  Extensive family hx on mothers side of glaucoma  Otherwise ROS negative   Patient IOP is borderline  Recent fields/oct rnfl without evidence of disease  Recommend monitoring off therapy    -  [facility] visual field (HVF) 24-2, dfe, oct rnfl      "

## 2023-10-12 ENCOUNTER — LAB (OUTPATIENT)
Dept: LAB | Facility: LAB | Age: 58
End: 2023-10-12
Payer: COMMERCIAL

## 2023-10-12 DIAGNOSIS — Z94.0 KIDNEY TRANSPLANT STATUS (HHS-HCC): Primary | ICD-10-CM

## 2023-10-12 LAB
ALBUMIN SERPL BCP-MCNC: 4.4 G/DL (ref 3.4–5)
ANION GAP SERPL CALC-SCNC: 12 MMOL/L (ref 10–20)
BUN SERPL-MCNC: 10 MG/DL (ref 6–23)
CALCIUM SERPL-MCNC: 10.1 MG/DL (ref 8.6–10.6)
CHLORIDE SERPL-SCNC: 103 MMOL/L (ref 98–107)
CO2 SERPL-SCNC: 29 MMOL/L (ref 21–32)
CREAT SERPL-MCNC: 0.78 MG/DL (ref 0.5–1.05)
ERYTHROCYTE [DISTWIDTH] IN BLOOD BY AUTOMATED COUNT: 14.5 % (ref 11.5–14.5)
GFR SERPL CREATININE-BSD FRML MDRD: 90 ML/MIN/1.73M*2
GLUCOSE SERPL-MCNC: 111 MG/DL (ref 74–99)
HCT VFR BLD AUTO: 39.5 % (ref 36–46)
HGB BLD-MCNC: 11.7 G/DL (ref 12–16)
MAGNESIUM SERPL-MCNC: 1.79 MG/DL (ref 1.6–2.4)
MCH RBC QN AUTO: 25.8 PG (ref 26–34)
MCHC RBC AUTO-ENTMCNC: 29.6 G/DL (ref 32–36)
MCV RBC AUTO: 87 FL (ref 80–100)
NRBC BLD-RTO: 0 /100 WBCS (ref 0–0)
PHOSPHATE SERPL-MCNC: 3.5 MG/DL (ref 2.5–4.9)
PLATELET # BLD AUTO: 481 X10*3/UL (ref 150–450)
PMV BLD AUTO: 11.3 FL (ref 7.5–11.5)
POTASSIUM SERPL-SCNC: 4.3 MMOL/L (ref 3.5–5.3)
RBC # BLD AUTO: 4.53 X10*6/UL (ref 4–5.2)
SODIUM SERPL-SCNC: 140 MMOL/L (ref 136–145)
TACROLIMUS BLD-MCNC: 8.4 NG/ML
WBC # BLD AUTO: 7.6 X10*3/UL (ref 4.4–11.3)

## 2023-10-12 PROCEDURE — 83735 ASSAY OF MAGNESIUM: CPT

## 2023-10-12 PROCEDURE — 85027 COMPLETE CBC AUTOMATED: CPT

## 2023-10-12 PROCEDURE — 80197 ASSAY OF TACROLIMUS: CPT

## 2023-10-12 PROCEDURE — 80069 RENAL FUNCTION PANEL: CPT

## 2023-10-12 PROCEDURE — 36415 COLL VENOUS BLD VENIPUNCTURE: CPT

## 2023-10-15 ENCOUNTER — DOCUMENTATION (OUTPATIENT)
Dept: TRANSPLANT | Facility: HOSPITAL | Age: 58
End: 2023-10-15
Payer: COMMERCIAL

## 2023-10-15 NOTE — PROGRESS NOTES
Late Entry:  Reviewed labs with Dr. Huang on 10/13/2023  Tac 8.4 from 6.9, 11.2 no recent med changes  Cr stable at 0.78   PLAN:  Continue to monitor

## 2023-10-16 LAB
ALLOSURE SCORE - KIDNEY: 0.12 %
CAREDX_ORDER_ID: NORMAL
CENTER_ORDER_ID: NORMAL
CLIENT SPECIMEN ID - ALLOSURE: NORMAL
DONOR RELATION - ALLOSURE: NORMAL
NOTES - ALLOSURE: NORMAL
RELATIVE CHANGE VALUE - KIDNEY: NORMAL %
TEST COMMENTS - ALLOSURE: NORMAL
TIME POST TX - ALLOSURE: NORMAL
TRANSPLANTED ORGAN - ALLOSURE: NORMAL
TX DATE - ALLOSURE/ALLOMAP: NORMAL
WP_ORDER_ID: NORMAL

## 2023-10-23 ENCOUNTER — SPECIALTY PHARMACY (OUTPATIENT)
Dept: PHARMACY | Facility: CLINIC | Age: 58
End: 2023-10-23

## 2023-10-23 ENCOUNTER — PHARMACY VISIT (OUTPATIENT)
Dept: PHARMACY | Facility: CLINIC | Age: 58
End: 2023-10-23
Payer: MEDICAID

## 2023-10-23 PROCEDURE — RXMED WILLOW AMBULATORY MEDICATION CHARGE

## 2023-10-26 ENCOUNTER — OFFICE VISIT (OUTPATIENT)
Dept: TRANSPLANT | Facility: HOSPITAL | Age: 58
End: 2023-10-26
Payer: COMMERCIAL

## 2023-10-26 ENCOUNTER — APPOINTMENT (OUTPATIENT)
Dept: TRANSPLANT | Facility: HOSPITAL | Age: 58
End: 2023-10-26
Payer: COMMERCIAL

## 2023-10-26 VITALS
OXYGEN SATURATION: 100 % | WEIGHT: 163.8 LBS | BODY MASS INDEX: 33.08 KG/M2 | HEART RATE: 85 BPM | TEMPERATURE: 97.9 F | DIASTOLIC BLOOD PRESSURE: 87 MMHG | SYSTOLIC BLOOD PRESSURE: 150 MMHG

## 2023-10-26 DIAGNOSIS — I10 ESSENTIAL HYPERTENSION: ICD-10-CM

## 2023-10-26 DIAGNOSIS — Z94.0 IMMUNOSUPPRESSIVE MANAGEMENT ENCOUNTER FOLLOWING KIDNEY TRANSPLANT (HHS-HCC): ICD-10-CM

## 2023-10-26 DIAGNOSIS — Z79.899 IMMUNOSUPPRESSIVE MANAGEMENT ENCOUNTER FOLLOWING KIDNEY TRANSPLANT (HHS-HCC): ICD-10-CM

## 2023-10-26 DIAGNOSIS — Z94.0 KIDNEY REPLACED BY TRANSPLANT (HHS-HCC): Primary | ICD-10-CM

## 2023-10-26 LAB
CREAT UR-MCNC: 33 MG/DL (ref 20–320)
CREAT UR-MCNC: 33.5 MG/DL (ref 20–320)
PROT UR-ACNC: <4 MG/DL (ref 5–24)
PROT/CREAT UR: ABNORMAL MG/G{CREAT}

## 2023-10-26 PROCEDURE — 82570 ASSAY OF URINE CREATININE: CPT | Performed by: INTERNAL MEDICINE

## 2023-10-26 PROCEDURE — 3077F SYST BP >= 140 MM HG: CPT | Performed by: INTERNAL MEDICINE

## 2023-10-26 PROCEDURE — 3079F DIAST BP 80-89 MM HG: CPT | Performed by: INTERNAL MEDICINE

## 2023-10-26 PROCEDURE — 99214 OFFICE O/P EST MOD 30 MIN: CPT | Performed by: INTERNAL MEDICINE

## 2023-10-26 PROCEDURE — 1036F TOBACCO NON-USER: CPT | Performed by: INTERNAL MEDICINE

## 2023-10-26 RX ORDER — LANOLIN ALCOHOL/MO/W.PET/CERES
1 CREAM (GRAM) TOPICAL 2 TIMES DAILY
Qty: 180 TABLET | Refills: 3 | Status: SHIPPED | OUTPATIENT
Start: 2023-10-26 | End: 2024-10-25

## 2023-10-26 RX ORDER — ACETAMINOPHEN 500 MG
2000 TABLET ORAL DAILY
Qty: 90 CAPSULE | Refills: 3 | Status: SHIPPED | OUTPATIENT
Start: 2023-10-26 | End: 2024-10-25

## 2023-10-26 ASSESSMENT — PAIN SCALES - GENERAL: PAINLEVEL: 0-NO PAIN

## 2023-11-01 NOTE — PROGRESS NOTES
TRANSPLANT NEPHROLOGY :   OUTPATIENT CLINIC NOTE      SERVICE DATE : 10/26/2023    REASON FOR VISIT/CHIEF COMPLAINT:  S/P  TRANSPLANT SURGERY  IMMUNOSUPPRESSIVE MEDICATION MANAGEMENT  BLOOD PRESSURE MANAGEMENT    HPI:    Ms. De La Cruz is a 57 y.o. female with past medical history significant for ESRD secondary to hypertensive nephrosclerosis s/p living donor kidney transplant on 3/8/2022. Patient's and donor EBV status is D negative/R positive and CMV status D-/R+. Patient was induced by Simulect and maintained on triple immunosuppression. Patient have spontaneous kidney function and did not require any dialysis post transplant her PD catheter removed along with the stent removal on 3/24/2022.     She had COVID on 12/13/22 and received Remdesivir daily. She has recovered fully 100 %. She just has some foggy brain. She has resumed usual dose of  mg bid.     Patient is here for follow up s/p kidney transplant.      Patient is doing well overall. No new complaints. Denied chest pain, SOB, SELBY, Palpitation. Normal urination and bowel movement. Normal gait and no weakness of arms/legs. No cough, runny nose, sore throat, cold symptoms, or rash. No hearing loss. Normal vision.No problems with his sleep, mood and function. No recent infection, hospitalization, surgery or ER visits.      ROS:  Review of  14 systems was performed system by system. See HPI. Otherwise, the symptoms were negative.    PAST MEDICAL HISTORY:  Past Medical History:   Diagnosis Date    Abnormal cytological findings in specimens from other organs, systems and tissues     Abnormal cytology    Adjustment disorder with depressed mood 12/05/2018    Grief    Allergy status to unspecified drugs, medicaments and biological substances     History of seasonal allergies    Atypical squamous cells of undetermined significance on cytologic smear of cervix (ASC-US) 03/06/2018    ASCUS with positive high risk HPV cervical    Carrier 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 carrier  of methicillin resistant Staphylococcus aureus 2021    MRSA carrier    Chronic kidney disease, stage 4 (severe) (CMS/HCC) 2022    CKD stage G4/A3, GFR 15-29 and albumin creatinine ratio >300 mg/g    Chronic kidney disease, stage 5 (CMS/HCC) 2022    CKD (chronic kidney disease) stage 5, GFR less than 15 ml/min    Cramp and spasm 10/07/2020    Nocturnal muscle cramp    Dependence on renal dialysis (CMS/HCC) 2022    Peritoneal dialysis status    Drug induced constipation 2022    Drug-induced constipation    Encounter for general adult medical examination without abnormal findings 2017    Well adult exam    Encounter for other preprocedural examination 2022    Pre-transplant evaluation for kidney transplant    Encounter for other preprocedural examination 2022    Preop testing    Encounter for other screening for malignant neoplasm of breast 2022    Breast cancer screening    Encounter for pregnancy test, result negative 2022    Urine pregnancy test negative    End stage renal disease (CMS/HCC) 2022    ESRD on dialysis    Essential (primary) hypertension 10/26/2022    Hypertension    Other acute postprocedural pain 2022    Post-operative pain    Other disorders of phosphorus metabolism 2021    Hyperphosphatemia    Other specified counseling 2018    Grief counseling    Pain in right leg 2019    Pain of right lower extremity    Personal history of other diseases of the digestive system 2022    History of constipation    Personal history of other medical treatment 06/10/2019    History of screening mammography    Shortness of breath 2022    SOB (shortness of breath) on exertion    Unspecified astigmatism, bilateral 2022    Astigmatism of both eyes        PAST SURGICAL HISTORY:  Past Surgical History:   Procedure Laterality Date     SECTION, CLASSIC  2018     Section    MOUTH SURGERY  2018     Oral Surgery Tooth Extraction    OTHER SURGICAL HISTORY  07/25/2017    Conclusion Of Operation Implants Mesh    OTHER SURGICAL HISTORY  05/13/2022    Kidney transplantation    TUBAL LIGATION  02/02/2018    Tubal Ligation        SOCIAL HISTORY:  Social History     Socioeconomic History    Marital status:      Spouse name: Not on file    Number of children: Not on file    Years of education: Not on file    Highest education level: Not on file   Occupational History    Not on file   Tobacco Use    Smoking status: Never    Smokeless tobacco: Never   Substance and Sexual Activity    Alcohol use: Never    Drug use: Never    Sexual activity: Not on file   Other Topics Concern    Not on file   Social History Narrative    Not on file     Social Determinants of Health     Financial Resource Strain: Not on file   Food Insecurity: Not on file   Transportation Needs: Not on file   Physical Activity: Not on file   Stress: Not on file   Social Connections: Not on file   Intimate Partner Violence: Not on file   Housing Stability: Not on file       FAMILY HISTORY:  Family History   Problem Relation Name Age of Onset    Glaucoma Maternal Grandfather         MEDICATION LIST:  Current Outpatient Medications   Medication Instructions    acetaminophen (Tylenol) 325 mg tablet oral, Every 6 hours PRN    amLODIPine (NORVASC) 10 mg, oral, Daily, as directed    aspirin 81 mg EC tablet 1 tablet, oral, Daily    carvedilol (Coreg) 6.25 mg tablet oral, 2 times daily    cholecalciferol (VITAMIN D-3) 2,000 Units, oral, Daily    magnesium oxide (MAG-OX) 400 mg, oral, 2 times daily    mycophenolate (Cellcept) 250 mg capsule TAKE THREE (3) CAPSULES BY MOUTH EVERY 12 HOURS.    pantoprazole (ProtoNix) 40 mg EC tablet 1 tablet, oral, Daily    predniSONE (Deltasone) 5 mg tablet TAKE ONE (1) TABLET BY MOUTH ONCE DAILY.    tacrolimus (PROGRAF) 1 mg, oral, Every 12 hours    tacrolimus (PROGRAF) 0.5 mg, oral, Every 12 hours    valGANciclovir  (Valcyte) 450 mg tablet oral, Every 24 hours    venlafaxine (Effexor) 37.5 mg tablet 1 tablet, oral, Daily         PHYSICAL EXAM:    Visit Vitals  /87   Pulse 85   Temp 36.6 °C (97.9 °F) (Temporal)   Wt 74.3 kg (163 lb 12.8 oz)   SpO2 100%   BMI 33.08 kg/m²   Smoking Status Never   BSA 1.76 m²          Vital signs - reviewed. Acceptable BP at this office visit.   General Appearance - NAD, Good speech, oriented and alert  HEENT - Supple. Not pale. No jaundice. No cervical lymphadenopathy. Pharynx and tonsils are not injected.  CVS - RRR. Normal S1/S2. No murmur, click , rub or gallop  Lungs- clear to auscultation bilaterally  Abdomen - soft , not tender, no guarding, no rigidity. No hepatosplenomegaly. Normal bowel sounds. No masses and ascites. S/P Kidney transplant .  Transplanted kidney is not tender.   Musculoskeletal /Extremities - no edema. Full ROM. No joint tenderness.   Neuro/Psych - appropriate mood and affect. Motor power V/V all extremities. CN I -XII were grossly intact.  Skin - No visible rash      LABS:    Lab Results   Component Value Date    WBC 7.6 10/12/2023    HGB 11.7 (L) 10/12/2023    HCT 39.5 10/12/2023     (H) 10/12/2023    CHOL 211 (H) 07/12/2023    TRIG 149 06/02/2022    HDL 77.8 07/12/2023    LDLDIRECT 104 07/12/2023    ALT 16 12/05/2022    AST 16 12/05/2022     10/12/2023    K 4.3 10/12/2023     10/12/2023    CREATININE 0.78 10/12/2023    BUN 10 10/12/2023    CO2 29 10/12/2023    TSH 1.00 07/12/2023    INR 1.0 03/08/2022    HGBA1C 5.5 12/13/2021     par    ASSESSMENT AND PLAN:    Ms. De La Cruz is a 57 y.o. female  who is here for follow up s/p kidney transplant.    TRANSPLANT DATE: 3/8/2022 (Kidney)      1. ESRD S/P kidney transplant   - Creatinine last check was :  Lab Results   Component Value Date    CREATININE 0.78 10/12/2023     Creatinine clearance cannot be calculated (Patient's most recent lab result is older than the maximum 7 days allowed.)    - Renal  allograft function is excellent.   -Random urine protein/creatinine ratio is due, ordered one today  -Allosure last check was 0.12%  -Ensure adequate hydration  - Avoid nephrotoxic medications, NSAIDs, and IV contrast.    2. Immunosuppression  -Tacrolimus level last check was 8.4  -Continue current immunosuppression regimen.    3. Electrolytes  Lab Results   Component Value Date    GLUCOSE 111 (H) 10/12/2023    CALCIUM 10.1 10/12/2023     10/12/2023    K 4.3 10/12/2023    CO2 29 10/12/2023     10/12/2023    BUN 10 10/12/2023    CREATININE 0.78 10/12/2023     -Acceptable from last lab drawn    4. Hypertension  Blood Pressures         10/26/2023  0903             BP: 150/87          -Home  BP had been acceptable  -Encourage to monitor home BP  -Continue current anti hypertensive medication    5. Bone Mineral Disease/Osteoporosis  -check VIT D, PTH with next lab  - Consider DEXA every 2-3 years , defer to PCP    6.Anemia  Lab Results   Component Value Date    WBC 7.6 10/12/2023    HGB 11.7 (L) 10/12/2023    HCT 39.5 10/12/2023    MCV 87 10/12/2023     (H) 10/12/2023     -asymptomatic  - Continue to monitor  -check iron studies and ferritin. Will consider DARIEN as needed.  - No indications for blood transfusion     7.Health maintenance and vaccination  - Flu shot during flu season annually  - Cancer screening is up to date per the patient    Lab : Routine transplant lab ( CBC, RFP, and anti-rejection trough level ), UPC ratio and allosure, BK PLASMA PCR  every 3 months  Additional labs:  VIT D, PTH with next lab      RTC 3-4  months    [facility]    Transplant Nephrology

## 2023-11-02 ENCOUNTER — OFFICE VISIT (OUTPATIENT)
Dept: OBSTETRICS AND GYNECOLOGY | Facility: CLINIC | Age: 58
End: 2023-11-02
Payer: COMMERCIAL

## 2023-11-02 VITALS
WEIGHT: 162 LBS | BODY MASS INDEX: 32.66 KG/M2 | SYSTOLIC BLOOD PRESSURE: 120 MMHG | HEIGHT: 59 IN | DIASTOLIC BLOOD PRESSURE: 76 MMHG

## 2023-11-02 DIAGNOSIS — Z01.419 ENCOUNTER FOR ANNUAL ROUTINE GYNECOLOGICAL EXAMINATION: Primary | ICD-10-CM

## 2023-11-02 PROCEDURE — 88175 CYTOPATH C/V AUTO FLUID REDO: CPT

## 2023-11-02 PROCEDURE — 87624 HPV HI-RISK TYP POOLED RSLT: CPT

## 2023-11-02 PROCEDURE — 3078F DIAST BP <80 MM HG: CPT | Performed by: OBSTETRICS & GYNECOLOGY

## 2023-11-02 PROCEDURE — 1036F TOBACCO NON-USER: CPT | Performed by: OBSTETRICS & GYNECOLOGY

## 2023-11-02 PROCEDURE — 99396 PREV VISIT EST AGE 40-64: CPT | Performed by: OBSTETRICS & GYNECOLOGY

## 2023-11-02 PROCEDURE — 3074F SYST BP LT 130 MM HG: CPT | Performed by: OBSTETRICS & GYNECOLOGY

## 2023-11-02 PROCEDURE — 88141 CYTOPATH C/V INTERPRET: CPT | Performed by: PATHOLOGY

## 2023-11-02 ASSESSMENT — PAIN SCALES - GENERAL: PAINLEVEL: 0-NO PAIN

## 2023-11-02 NOTE — PROGRESS NOTES
"57-year-old obese -3-0-3 -American woman presents today for annual GYN exam.  She has been amenorrheic with now more manageable menopause symptoms over the last year or 2.    The patient had a renal transplant in 2022.    Has a history of abnormal Pap smears.    - LGSIL with negative high risk HPV testing    - colposcopy negative    - ASCUS with negative high risk HPV testing    - negative Pap negative HPV testing    - colposcopy negative    - ASCUS with positive high risk HPV testing, HPV 16/18 were negative    GynHx: Menarche began at age 11.  She use to have monthly cycles.  She denies any STIs, PID or sexual abuse.  She is sexually active with 1 male partner, her .    OBHx:  section, followed by a  at 7 months (that child ).   x2 36 weeks with cerclages.       Objective   /76   Ht 1.499 m (4' 11\")   Wt 73.5 kg (162 lb)   BMI 32.72 kg/m²     General:   alert and oriented, in no acute distress, appears stated age, and cooperative   Heart: regular rate and rhythm, S1, S2 normal, no murmur, click, rub or gallop   Lungs: clear to auscultation bilaterally   Abdomen: soft, non-tender, without masses or organomegaly   Vulva: Bartholin's, Urethra, Thomas's normal   Vagina: normal mucosa, pH     Cervix: no cervical motion tenderness   Uterus: Exam limited by habitus and kidney, no clear masses noted   Adnexa: no mass, fullness, tenderness   Breast:     A/P: APE    -  Pap sent     -  Mammogram     -  PCP F/U prn   "

## 2023-11-02 NOTE — PATIENT INSTRUCTIONS
Thanks for coming in today for your annual GYN exam.    A Pap smear was sent.  Results should be available in the next few weeks. However, if you are unable to review the results by the end of the month please give the office a call.    Arrange to have a mammogram performed once a year.    Follow-up with your PCP and other healthcare specialist as needed.    Feel free to call the office with any problems, questions or concerns prior to your neck scheduled visit.

## 2023-11-15 LAB
CYTOLOGY CMNT CVX/VAG CYTO-IMP: NORMAL
HPV HR 12 DNA GENITAL QL NAA+PROBE: POSITIVE
HPV HR GENOTYPES PNL CVX NAA+PROBE: POSITIVE
HPV16 DNA SPEC QL NAA+PROBE: NEGATIVE
HPV18 DNA SPEC QL NAA+PROBE: NEGATIVE
LAB AP HPV GENOTYPE QUESTION: YES
LAB AP HPV HR: NORMAL
LAB AP PREVIOUS ABNORMAL HISTORY: NORMAL
LABORATORY COMMENT REPORT: NORMAL
MENSTRUAL HX REPORTED: NORMAL
PATH REPORT.TOTAL CANCER: NORMAL

## 2023-11-20 ENCOUNTER — PHARMACY VISIT (OUTPATIENT)
Dept: PHARMACY | Facility: CLINIC | Age: 58
End: 2023-11-20
Payer: MEDICAID

## 2023-11-20 ENCOUNTER — SPECIALTY PHARMACY (OUTPATIENT)
Dept: PHARMACY | Facility: CLINIC | Age: 58
End: 2023-11-20

## 2023-11-20 PROCEDURE — RXMED WILLOW AMBULATORY MEDICATION CHARGE

## 2023-11-21 ENCOUNTER — PHARMACY VISIT (OUTPATIENT)
Dept: PHARMACY | Facility: CLINIC | Age: 58
End: 2023-11-21
Payer: MEDICAID

## 2023-11-21 PROCEDURE — RXMED WILLOW AMBULATORY MEDICATION CHARGE

## 2023-11-28 ENCOUNTER — TELEPHONE (OUTPATIENT)
Dept: OBSTETRICS AND GYNECOLOGY | Facility: CLINIC | Age: 58
End: 2023-11-28
Payer: COMMERCIAL

## 2023-11-28 NOTE — TELEPHONE ENCOUNTER
Pt verified by name and .  Pt is aware Dr. Sol recommends a colp.  Nurse scheduled pt for colp on 2024 at 2:30 pm.  Pt has no questions or concerns at this time.

## 2023-12-18 ENCOUNTER — APPOINTMENT (OUTPATIENT)
Dept: RADIOLOGY | Facility: CLINIC | Age: 58
End: 2023-12-18
Payer: COMMERCIAL

## 2023-12-18 PROCEDURE — RXMED WILLOW AMBULATORY MEDICATION CHARGE

## 2023-12-19 ENCOUNTER — PHARMACY VISIT (OUTPATIENT)
Dept: PHARMACY | Facility: CLINIC | Age: 58
End: 2023-12-19
Payer: MEDICAID

## 2024-01-04 ENCOUNTER — OFFICE VISIT (OUTPATIENT)
Dept: PRIMARY CARE | Facility: CLINIC | Age: 59
End: 2024-01-04
Payer: COMMERCIAL

## 2024-01-04 ENCOUNTER — LAB (OUTPATIENT)
Dept: LAB | Facility: LAB | Age: 59
End: 2024-01-04
Payer: COMMERCIAL

## 2024-01-04 VITALS
DIASTOLIC BLOOD PRESSURE: 70 MMHG | BODY MASS INDEX: 32.74 KG/M2 | TEMPERATURE: 97.8 F | HEIGHT: 59 IN | HEART RATE: 88 BPM | OXYGEN SATURATION: 99 % | SYSTOLIC BLOOD PRESSURE: 116 MMHG | RESPIRATION RATE: 16 BRPM | WEIGHT: 162.4 LBS

## 2024-01-04 DIAGNOSIS — D84.9 IMMUNODEFICIENCY, UNSPECIFIED (MULTI): ICD-10-CM

## 2024-01-04 DIAGNOSIS — N25.81 SECONDARY HYPERPARATHYROIDISM, RENAL (MULTI): ICD-10-CM

## 2024-01-04 DIAGNOSIS — I10 ESSENTIAL HYPERTENSION: Primary | ICD-10-CM

## 2024-01-04 DIAGNOSIS — Z94.0 KIDNEY REPLACED BY TRANSPLANT (HHS-HCC): ICD-10-CM

## 2024-01-04 DIAGNOSIS — Z23 NEED FOR VACCINATION: ICD-10-CM

## 2024-01-04 LAB
ALBUMIN SERPL BCP-MCNC: 4.4 G/DL (ref 3.4–5)
ANION GAP SERPL CALC-SCNC: 15 MMOL/L (ref 10–20)
BUN SERPL-MCNC: 12 MG/DL (ref 6–23)
CALCIUM SERPL-MCNC: 10.2 MG/DL (ref 8.6–10.6)
CHLORIDE SERPL-SCNC: 105 MMOL/L (ref 98–107)
CO2 SERPL-SCNC: 27 MMOL/L (ref 21–32)
CREAT SERPL-MCNC: 0.89 MG/DL (ref 0.5–1.05)
CREAT UR-MCNC: 96.3 MG/DL (ref 20–320)
ERYTHROCYTE [DISTWIDTH] IN BLOOD BY AUTOMATED COUNT: 14.5 % (ref 11.5–14.5)
GFR SERPL CREATININE-BSD FRML MDRD: 76 ML/MIN/1.73M*2
GLUCOSE SERPL-MCNC: 129 MG/DL (ref 74–99)
HCT VFR BLD AUTO: 40 % (ref 36–46)
HGB BLD-MCNC: 11.9 G/DL (ref 12–16)
MCH RBC QN AUTO: 26.3 PG (ref 26–34)
MCHC RBC AUTO-ENTMCNC: 29.8 G/DL (ref 32–36)
MCV RBC AUTO: 88 FL (ref 80–100)
NRBC BLD-RTO: 0 /100 WBCS (ref 0–0)
PHOSPHATE SERPL-MCNC: 3.6 MG/DL (ref 2.5–4.9)
PLATELET # BLD AUTO: 482 X10*3/UL (ref 150–450)
POTASSIUM SERPL-SCNC: 4.6 MMOL/L (ref 3.5–5.3)
PROT UR-ACNC: 11 MG/DL (ref 5–24)
PROT/CREAT UR: 0.11 MG/MG CREAT (ref 0–0.17)
RBC # BLD AUTO: 4.53 X10*6/UL (ref 4–5.2)
SODIUM SERPL-SCNC: 142 MMOL/L (ref 136–145)
TACROLIMUS BLD-MCNC: 8.4 NG/ML
WBC # BLD AUTO: 7.6 X10*3/UL (ref 4.4–11.3)

## 2024-01-04 PROCEDURE — 90472 IMMUNIZATION ADMIN EACH ADD: CPT | Performed by: FAMILY MEDICINE

## 2024-01-04 PROCEDURE — 84156 ASSAY OF PROTEIN URINE: CPT

## 2024-01-04 PROCEDURE — 80197 ASSAY OF TACROLIMUS: CPT

## 2024-01-04 PROCEDURE — 1036F TOBACCO NON-USER: CPT | Performed by: FAMILY MEDICINE

## 2024-01-04 PROCEDURE — 90715 TDAP VACCINE 7 YRS/> IM: CPT | Performed by: FAMILY MEDICINE

## 2024-01-04 PROCEDURE — 85027 COMPLETE CBC AUTOMATED: CPT

## 2024-01-04 PROCEDURE — 90471 IMMUNIZATION ADMIN: CPT | Performed by: FAMILY MEDICINE

## 2024-01-04 PROCEDURE — 90677 PCV20 VACCINE IM: CPT | Performed by: FAMILY MEDICINE

## 2024-01-04 PROCEDURE — 80069 RENAL FUNCTION PANEL: CPT

## 2024-01-04 PROCEDURE — 3078F DIAST BP <80 MM HG: CPT | Performed by: FAMILY MEDICINE

## 2024-01-04 PROCEDURE — 36415 COLL VENOUS BLD VENIPUNCTURE: CPT

## 2024-01-04 PROCEDURE — 3074F SYST BP LT 130 MM HG: CPT | Performed by: FAMILY MEDICINE

## 2024-01-04 PROCEDURE — 99396 PREV VISIT EST AGE 40-64: CPT | Performed by: FAMILY MEDICINE

## 2024-01-04 PROCEDURE — 87799 DETECT AGENT NOS DNA QUANT: CPT

## 2024-01-04 PROCEDURE — 82570 ASSAY OF URINE CREATININE: CPT

## 2024-01-04 ASSESSMENT — PATIENT HEALTH QUESTIONNAIRE - PHQ9
SUM OF ALL RESPONSES TO PHQ9 QUESTIONS 1 AND 2: 0
2. FEELING DOWN, DEPRESSED OR HOPELESS: NOT AT ALL
1. LITTLE INTEREST OR PLEASURE IN DOING THINGS: NOT AT ALL

## 2024-01-04 NOTE — PROGRESS NOTES
"Subjective   Patient ID: Farzana De La Cruz is a 58 y.o. female who presents for Annual Exam.    HPI     Here for a physical  Does not want a chaperone.     Other concerns/issues/followup:  1 - HTN  BP at goal today in office.  Using medications without issues.  Denies CP, SOB, palpitations, change in vision, dizziness, N/V.      PMSFH was reviewed & updated.     ---Social---  Job:  [employer] working from home  :   Kids: 3  Likes/hobbies:  reading and movies     ETOH - none  Drugs - none  Tobacco - none   Review of Systems  All systems reviewed and neg if not noted in the HPI above    Objective   /70 (Patient Position: Sitting)   Pulse 88   Temp 36.6 °C (97.8 °F)   Resp 16   Ht 1.499 m (4' 11\")   Wt 73.7 kg (162 lb 6.4 oz)   SpO2 99%   BMI 32.80 kg/m²     Physical Exam  Pleasant  Eyes: conjunctiva non-icteric and eye lids are without obvious rash or drooping. Pupils are symmetric.   Ears, Nose, Mouth, and Throat: External ears and nose appear to be without deformity or rash. No lesions or masses noted. Hearing is grossly intact.   CV: RRR, no murmur  Carotids: no bruits  Pulm:CTA B/L  Abd: soft, NTTP, + BS  LE: no edema  Psychiatric: Alert, orientation to person, place, and time. Recent/remote memory as evidenced through face-to-face interaction and discussion appear grossly intact. Mood and affect are normal.       Assessment/Plan   Problem List Items Addressed This Visit             ICD-10-CM    Essential hypertension - Primary  - Your blood pressure is at goal today- goal is less than 130/80  - Continue your current medications  - Weight loss can help lower your bp!  Work on a healthy whole food diet and add at least 30min of exercise 5 days per week  - Work on a low salt diet   I10    Immunosuppression (CMS/HCC) D84.9     Continue meds per kidney team  Stable         Secondary hyperparathyroidism, renal (CMS/HCC) N25.81     Improved from 2022  Recheckinglabs today         Relevant Orders "    PTH, intact     Other Visit Diagnoses         Codes    Need for vaccination     Z23    Relevant Orders    Pneumococcal conjugate vaccine, 20-valent (PREVNAR 20)    Tdap vaccine, age 7 years and older              Please follow up in JULY for HTN or as needed.

## 2024-01-05 LAB
BKV DNA SERPL NAA+PROBE-LOG#: NORMAL {LOG_COPIES}/ML
LABORATORY COMMENT REPORT: NOT DETECTED

## 2024-01-17 ENCOUNTER — PROCEDURE VISIT (OUTPATIENT)
Dept: OBSTETRICS AND GYNECOLOGY | Facility: CLINIC | Age: 59
End: 2024-01-17
Payer: COMMERCIAL

## 2024-01-17 VITALS
WEIGHT: 162 LBS | DIASTOLIC BLOOD PRESSURE: 80 MMHG | SYSTOLIC BLOOD PRESSURE: 140 MMHG | HEIGHT: 59 IN | BODY MASS INDEX: 32.66 KG/M2

## 2024-01-17 DIAGNOSIS — R87.619 ABNORMAL CERVICAL PAPANICOLAOU SMEAR, UNSPECIFIED ABNORMAL PAP FINDING: Primary | ICD-10-CM

## 2024-01-17 RX ORDER — UBIDECARENONE 75 MG
CAPSULE ORAL
COMMUNITY

## 2024-01-17 NOTE — PROGRESS NOTES
58-year-old obese -3-0-3 -American woman presents today for follow up. She has a history of abnormal Paps:     - LGSIL Pap, +HR HPV, 16/18 negative     - LGSIL with negative high risk HPV testing    - colposcopy negative    - ASCUS with negative high risk HPV testing    - negative Pap negative HPV testing    - colposcopy negative    - ASCUS with positive high risk HPV testing, 16/18 negative    [facility]P recommendation is for repeat Pap in 1 year - 2023.   Thus visit cancelled. Pt to RTC 2024 for repeat Pap w/HPV testing.

## 2024-01-21 PROCEDURE — RXMED WILLOW AMBULATORY MEDICATION CHARGE

## 2024-01-22 PROCEDURE — RXMED WILLOW AMBULATORY MEDICATION CHARGE

## 2024-01-23 ENCOUNTER — PHARMACY VISIT (OUTPATIENT)
Dept: PHARMACY | Facility: CLINIC | Age: 59
End: 2024-01-23
Payer: MEDICAID

## 2024-02-07 ENCOUNTER — LAB (OUTPATIENT)
Dept: LAB | Facility: LAB | Age: 59
End: 2024-02-07
Payer: COMMERCIAL

## 2024-02-07 DIAGNOSIS — N25.81 SECONDARY HYPERPARATHYROIDISM, RENAL (MULTI): ICD-10-CM

## 2024-02-07 DIAGNOSIS — Z94.0 KIDNEY REPLACED BY TRANSPLANT (HHS-HCC): ICD-10-CM

## 2024-02-07 LAB — PTH-INTACT SERPL-MCNC: 143.4 PG/ML (ref 18.5–88)

## 2024-02-07 PROCEDURE — 83970 ASSAY OF PARATHORMONE: CPT

## 2024-02-07 PROCEDURE — 36415 COLL VENOUS BLD VENIPUNCTURE: CPT

## 2024-02-09 LAB
ALLOSURE SCORE - KIDNEY: 0.14 %
CAREDX_ORDER_ID: NORMAL
CENTER_ORDER_ID: NORMAL
CLIENT SPECIMEN ID - ALLOSURE: NORMAL
DONOR RELATION - ALLOSURE: NORMAL
NOTES - ALLOSURE: NORMAL
RELATIVE CHANGE VALUE - KIDNEY: NORMAL %
TEST COMMENTS - ALLOSURE: NORMAL
TIME POST TX - ALLOSURE: NORMAL
TRANSPLANTED ORGAN - ALLOSURE: NORMAL
TX DATE - ALLOSURE/ALLOMAP: NORMAL
WP_ORDER_ID: NORMAL

## 2024-02-12 DIAGNOSIS — Z94.0 KIDNEY REPLACED BY TRANSPLANT (HHS-HCC): ICD-10-CM

## 2024-02-12 DIAGNOSIS — E21.0 HYPERPARATHYROID BONE DISEASE (MULTI): ICD-10-CM

## 2024-02-15 PROCEDURE — RXMED WILLOW AMBULATORY MEDICATION CHARGE

## 2024-02-20 ENCOUNTER — SPECIALTY PHARMACY (OUTPATIENT)
Dept: PHARMACY | Facility: CLINIC | Age: 59
End: 2024-02-20

## 2024-02-21 ENCOUNTER — PHARMACY VISIT (OUTPATIENT)
Dept: PHARMACY | Facility: CLINIC | Age: 59
End: 2024-02-21
Payer: MEDICAID

## 2024-02-29 ENCOUNTER — OFFICE VISIT (OUTPATIENT)
Dept: TRANSPLANT | Facility: HOSPITAL | Age: 59
End: 2024-02-29
Payer: COMMERCIAL

## 2024-02-29 ENCOUNTER — LAB (OUTPATIENT)
Dept: LAB | Facility: LAB | Age: 59
End: 2024-02-29
Payer: COMMERCIAL

## 2024-02-29 VITALS
OXYGEN SATURATION: 100 % | DIASTOLIC BLOOD PRESSURE: 83 MMHG | TEMPERATURE: 97.5 F | WEIGHT: 161.3 LBS | SYSTOLIC BLOOD PRESSURE: 139 MMHG | BODY MASS INDEX: 32.58 KG/M2 | HEART RATE: 92 BPM

## 2024-02-29 DIAGNOSIS — Z94.0 KIDNEY REPLACED BY TRANSPLANT (HHS-HCC): ICD-10-CM

## 2024-02-29 DIAGNOSIS — R63.5 WEIGHT GAIN DUE TO MEDICATION: ICD-10-CM

## 2024-02-29 DIAGNOSIS — Z79.899 IMMUNOSUPPRESSIVE MANAGEMENT ENCOUNTER FOLLOWING KIDNEY TRANSPLANT (HHS-HCC): ICD-10-CM

## 2024-02-29 DIAGNOSIS — E55.9 VITAMIN D DEFICIENCY: ICD-10-CM

## 2024-02-29 DIAGNOSIS — E78.5 DYSLIPIDEMIA: ICD-10-CM

## 2024-02-29 DIAGNOSIS — T50.905A WEIGHT GAIN DUE TO MEDICATION: ICD-10-CM

## 2024-02-29 DIAGNOSIS — I10 ESSENTIAL HYPERTENSION: ICD-10-CM

## 2024-02-29 DIAGNOSIS — Z94.0 KIDNEY REPLACED BY TRANSPLANT (HHS-HCC): Primary | ICD-10-CM

## 2024-02-29 DIAGNOSIS — Z94.0 IMMUNOSUPPRESSIVE MANAGEMENT ENCOUNTER FOLLOWING KIDNEY TRANSPLANT (HHS-HCC): ICD-10-CM

## 2024-02-29 LAB
25(OH)D3 SERPL-MCNC: 34 NG/ML (ref 30–100)
ALBUMIN SERPL BCP-MCNC: 4.8 G/DL (ref 3.4–5)
ANION GAP SERPL CALC-SCNC: 15 MMOL/L (ref 10–20)
BUN SERPL-MCNC: 11 MG/DL (ref 6–23)
CALCIUM SERPL-MCNC: 10.2 MG/DL (ref 8.6–10.6)
CHLORIDE SERPL-SCNC: 102 MMOL/L (ref 98–107)
CHOLEST SERPL-MCNC: 192 MG/DL (ref 0–199)
CHOLESTEROL/HDL RATIO: 2.6
CO2 SERPL-SCNC: 26 MMOL/L (ref 21–32)
CREAT SERPL-MCNC: 0.7 MG/DL (ref 0.5–1.05)
CREAT UR-MCNC: 47.1 MG/DL (ref 20–320)
EGFRCR SERPLBLD CKD-EPI 2021: >90 ML/MIN/1.73M*2
ERYTHROCYTE [DISTWIDTH] IN BLOOD BY AUTOMATED COUNT: 14.3 % (ref 11.5–14.5)
EST. AVERAGE GLUCOSE BLD GHB EST-MCNC: 143 MG/DL
GLUCOSE SERPL-MCNC: 106 MG/DL (ref 74–99)
HBA1C MFR BLD: 6.6 %
HCT VFR BLD AUTO: 40.2 % (ref 36–46)
HDLC SERPL-MCNC: 74.6 MG/DL
HGB BLD-MCNC: 12.2 G/DL (ref 12–16)
LDLC SERPL CALC-MCNC: 95 MG/DL
MAGNESIUM SERPL-MCNC: 1.84 MG/DL (ref 1.6–2.4)
MCH RBC QN AUTO: 26.2 PG (ref 26–34)
MCHC RBC AUTO-ENTMCNC: 30.3 G/DL (ref 32–36)
MCV RBC AUTO: 87 FL (ref 80–100)
NON HDL CHOLESTEROL: 117 MG/DL (ref 0–149)
NRBC BLD-RTO: 0 /100 WBCS (ref 0–0)
PHOSPHATE SERPL-MCNC: 3.5 MG/DL (ref 2.5–4.9)
PLATELET # BLD AUTO: 486 X10*3/UL (ref 150–450)
POTASSIUM SERPL-SCNC: 4.2 MMOL/L (ref 3.5–5.3)
PROT UR-ACNC: 4 MG/DL (ref 5–24)
PROT/CREAT UR: 0.08 MG/MG CREAT (ref 0–0.17)
PTH-INTACT SERPL-MCNC: 170.2 PG/ML (ref 18.5–88)
RBC # BLD AUTO: 4.65 X10*6/UL (ref 4–5.2)
SODIUM SERPL-SCNC: 139 MMOL/L (ref 136–145)
TACROLIMUS BLD-MCNC: 6.4 NG/ML
TRIGL SERPL-MCNC: 111 MG/DL (ref 0–149)
VLDL: 22 MG/DL (ref 0–40)
WBC # BLD AUTO: 7.9 X10*3/UL (ref 4.4–11.3)

## 2024-02-29 PROCEDURE — 80061 LIPID PANEL: CPT

## 2024-02-29 PROCEDURE — 83735 ASSAY OF MAGNESIUM: CPT

## 2024-02-29 PROCEDURE — 83970 ASSAY OF PARATHORMONE: CPT

## 2024-02-29 PROCEDURE — 83036 HEMOGLOBIN GLYCOSYLATED A1C: CPT

## 2024-02-29 PROCEDURE — 87799 DETECT AGENT NOS DNA QUANT: CPT

## 2024-02-29 PROCEDURE — 1036F TOBACCO NON-USER: CPT | Performed by: INTERNAL MEDICINE

## 2024-02-29 PROCEDURE — 85027 COMPLETE CBC AUTOMATED: CPT

## 2024-02-29 PROCEDURE — 82570 ASSAY OF URINE CREATININE: CPT

## 2024-02-29 PROCEDURE — 3079F DIAST BP 80-89 MM HG: CPT | Performed by: INTERNAL MEDICINE

## 2024-02-29 PROCEDURE — 84156 ASSAY OF PROTEIN URINE: CPT

## 2024-02-29 PROCEDURE — 82306 VITAMIN D 25 HYDROXY: CPT

## 2024-02-29 PROCEDURE — 80197 ASSAY OF TACROLIMUS: CPT

## 2024-02-29 PROCEDURE — 80069 RENAL FUNCTION PANEL: CPT

## 2024-02-29 PROCEDURE — 36415 COLL VENOUS BLD VENIPUNCTURE: CPT

## 2024-02-29 PROCEDURE — 99214 OFFICE O/P EST MOD 30 MIN: CPT | Performed by: INTERNAL MEDICINE

## 2024-02-29 PROCEDURE — 3075F SYST BP GE 130 - 139MM HG: CPT | Performed by: INTERNAL MEDICINE

## 2024-02-29 ASSESSMENT — PAIN SCALES - GENERAL: PAINLEVEL: 0-NO PAIN

## 2024-02-29 NOTE — PATIENT INSTRUCTIONS
Referral placed for weight management, Dr. Valentín Zheng  Labs today with BK and then every 3 months  Return to see us: 6 months   Increase Vit D to twice a day

## 2024-02-29 NOTE — PROGRESS NOTES
TRANSPLANT NEPHROLOGY :   OUTPATIENT CLINIC NOTE      SERVICE DATE : 02/29/2024    REASON FOR VISIT/CHIEF COMPLAINT:  S/P  TRANSPLANT SURGERY  IMMUNOSUPPRESSIVE MEDICATION MANAGEMENT  BLOOD PRESSURE MANAGEMENT    HPI:    Ms. De La Cruz is a 58 y.o. female with past medical history significant for  ESRD secondary to hypertensive nephrosclerosis s/p living donor kidney transplant on 3/8/2022. Patient's and donor EBV status is D negative/R positive and CMV status D-/R+. Patient was induced by Simulect and maintained on triple immunosuppression. Patient have spontaneous kidney function and did not require any dialysis post transplant her PD catheter removed along with the stent removal on 3/24/2022.      Patient is here for follow up s/p kidney transplant.    She just saw OB-gyne for abnormal PAP smear.     Patient is doing well overall. No new complaints. Denied chest pain, SOB, SELBY, Palpitation. Normal urination and bowel movement. Normal gait and no weakness of arms/legs. No cough, runny nose, sore throat, cold symptoms, or rash. No hearing loss. Normal vision.No problems with his sleep, mood and function. No recent infection, hospitalization, surgery or ER visits.      ROS:  Review of  14 systems was performed system by system. See HPI. Otherwise, the symptoms were negative.    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 11/24/2021    MRSA carrier    Chronic kidney disease, stage 4 (severe)  (CMS/HCC) 2022    CKD stage G4/A3, GFR 15-29 and albumin creatinine ratio >300 mg/g    Chronic kidney disease, stage 5 (CMS/HCC) 2022    CKD (chronic kidney disease) stage 5, GFR less than 15 ml/min    Cramp and spasm 10/07/2020    Nocturnal muscle cramp    Dependence on renal dialysis (CMS/HCC) 2022    Peritoneal dialysis status    Drug induced constipation 2022    Drug-induced constipation    Encounter for general adult medical examination without abnormal findings 2017    Well adult exam    Encounter for other preprocedural examination 2022    Pre-transplant evaluation for kidney transplant    Encounter for other preprocedural examination 2022    Preop testing    Encounter for other screening for malignant neoplasm of breast 2022    Breast cancer screening    Encounter for pregnancy test, result negative 2022    Urine pregnancy test negative    End stage renal disease (CMS/HCC) 2022    ESRD on dialysis    Essential (primary) hypertension 10/26/2022    Hypertension    Other acute postprocedural pain 2022    Post-operative pain    Other disorders of phosphorus metabolism 2021    Hyperphosphatemia    Other specified counseling 2018    Grief counseling    Pain in right leg 2019    Pain of right lower extremity    Personal history of other diseases of the digestive system 2022    History of constipation    Personal history of other medical treatment 06/10/2019    History of screening mammography    Shortness of breath 2022    SOB (shortness of breath) on exertion    Unspecified astigmatism, bilateral 2022    Astigmatism of both eyes        PAST SURGICAL HISTORY:  Past Surgical History:   Procedure Laterality Date     SECTION, CLASSIC  2018     Section    MOUTH SURGERY  2018    Oral Surgery Tooth Extraction    OTHER SURGICAL HISTORY  2017    Conclusion Of Operation Implants Mesh     OTHER SURGICAL HISTORY  05/13/2022    Kidney transplantation    TUBAL LIGATION  02/02/2018    Tubal Ligation        SOCIAL HISTORY:  Social History     Socioeconomic History    Marital status:      Spouse name: Not on file    Number of children: Not on file    Years of education: Not on file    Highest education level: Not on file   Occupational History    Not on file   Tobacco Use    Smoking status: Never    Smokeless tobacco: Never   Substance and Sexual Activity    Alcohol use: Never    Drug use: Never    Sexual activity: Not on file   Other Topics Concern    Not on file   Social History Narrative    Not on file     Social Determinants of Health     Financial Resource Strain: Not on file   Food Insecurity: Not on file   Transportation Needs: Not on file   Physical Activity: Not on file   Stress: Not on file   Social Connections: Not on file   Intimate Partner Violence: Not on file   Housing Stability: Not on file       FAMILY HISTORY:  Family History   Problem Relation Name Age of Onset    Glaucoma Maternal Grandfather         MEDICATION LIST:  Current Outpatient Medications   Medication Instructions    acetaminophen (Tylenol) 325 mg tablet oral, Every 6 hours PRN    amLODIPine (NORVASC) 10 mg, oral, Daily, as directed    aspirin 81 mg EC tablet 1 tablet, oral, Daily    carvedilol (Coreg) 6.25 mg tablet oral, 2 times daily    cholecalciferol (VITAMIN D-3) 2,000 Units, oral, Daily    cyanocobalamin (Vitamin B-12) 500 mcg tablet oral    magnesium oxide (MAG-OX) 400 mg, oral, 2 times daily    mycophenolate (Cellcept) 250 mg capsule TAKE THREE (3) CAPSULES BY MOUTH EVERY 12 HOURS.    pantoprazole (ProtoNix) 40 mg EC tablet 1 tablet, oral, Daily    predniSONE (Deltasone) 5 mg tablet TAKE ONE (1) TABLET BY MOUTH ONCE DAILY.    tacrolimus (PROGRAF) 1 mg, oral, Every 12 hours    tacrolimus (PROGRAF) 0.5 mg, oral, Every 12 hours       ALLERGY  Allergies   Allergen Reactions    Egg Unknown    Latex Itching, Rash and  Unknown    Senna Other     cramps       PHYSICAL EXAM:    Visit Vitals  /83   Pulse 92   Temp 36.4 °C (97.5 °F) (Temporal)   Wt 73.2 kg (161 lb 4.8 oz)   SpO2 100%   BMI 32.58 kg/m²   OB Status Postmenopausal   Smoking Status Never   BSA 1.75 m²          Vital signs - reviewed. Acceptable BP at this office visit.   General Appearance - NAD, Good speech, oriented and alert  HEENT - Supple. Not pale. No jaundice. No cervical lymphadenopathy. Pharynx and tonsils are not injected.  CVS - RRR. Normal S1/S2. No murmur, click , rub or gallop  Lungs- clear to auscultation bilaterally  Abdomen - soft , not tender, no guarding, no rigidity. No hepatosplenomegaly. Normal bowel sounds. No masses and ascites. S/P Kidney transplant .  Transplanted kidney is not tender.   Musculoskeletal /Extremities - no edema. Full ROM. No joint tenderness.   Neuro/Psych - appropriate mood and affect. Motor power V/V all extremities. CN I -XII were grossly intact.  Skin - No visible rash      LABS:    Lab Results   Component Value Date    WBC 7.6 01/04/2024    HGB 11.9 (L) 01/04/2024    HCT 40.0 01/04/2024     (H) 01/04/2024    CHOL 211 (H) 07/12/2023    TRIG 149 06/02/2022    HDL 77.8 07/12/2023    LDLDIRECT 104 07/12/2023    ALT 16 12/05/2022    AST 16 12/05/2022     01/04/2024    K 4.6 01/04/2024     01/04/2024    CREATININE 0.89 01/04/2024    BUN 12 01/04/2024    CO2 27 01/04/2024    TSH 1.00 07/12/2023    INR 1.0 03/08/2022    HGBA1C 5.5 12/13/2021     par    ASSESSMENT AND PLAN:    Ms. De La Cruz is a 58 y.o. female  who is here for follow up s/p kidney transplant.    TRANSPLANT DATE: 3/8/2022 (Kidney)      1. ESRD S/P kidney transplant   - Creatinine last check was :  Lab Results   Component Value Date    CREATININE 0.89 01/04/2024     Creatinine clearance cannot be calculated (Patient's most recent lab result is older than the maximum 7 days allowed.)    - Renal allograft function is excellent  -   -Allosure last check  was NORMAL  -Ensure adequate hydration  - Avoid nephrotoxic medications, NSAIDs, and IV contrast.    2. Immunosuppression  -Continue current immunosuppression regimen.    3. Electrolytes  Lab Results   Component Value Date    GLUCOSE 129 (H) 01/04/2024    CALCIUM 10.2 01/04/2024     01/04/2024    K 4.6 01/04/2024    CO2 27 01/04/2024     01/04/2024    BUN 12 01/04/2024    CREATININE 0.89 01/04/2024     -Acceptable from last lab drawn    4. Hypertension  Blood Pressures         2/29/2024  0835             BP: 139/83          -Home  BP had been acceptable  -Encourage to monitor home BP  -Continue current anti hypertensive medication    5. Bone Mineral Disease/Osteoporosis  Lab Results   Component Value Date    .4 (H) 02/07/2024    CALCIUM 10.2 01/04/2024    PHOS 3.6 01/04/2024    VITD25 35 03/09/2023     -check VIT D, PTH with next lab  - Consider DEXA every 2-3 years , defer to PCP    6.Anemia  Lab Results   Component Value Date    WBC 7.6 01/04/2024    HGB 11.9 (L) 01/04/2024    HCT 40.0 01/04/2024    MCV 88 01/04/2024     (H) 01/04/2024     -asymptomatic  - Continue to monitor  -check iron studies and ferritin. Will consider DARIEN as needed.  - No indications for blood transfusion     7.Health maintenance and vaccination  - Flu shot during flu season annually  - Cancer screening is up to date per the patient      Additional Plan :  Increase vitamin D to bid  Lab today; then q 3 months  Took tacrolimus last night at 8:30 pm  Adding Lipid, A1C and BK PCR per protocol  RTC 6 month(s)  Refer for weight management Dr Laura  Will see PCP next month for vaccine, cancer screening, health maintenance    Danette Garrido    Transplant Nephrology

## 2024-03-01 ENCOUNTER — DOCUMENTATION (OUTPATIENT)
Dept: TRANSPLANT | Facility: HOSPITAL | Age: 59
End: 2024-03-01
Payer: COMMERCIAL

## 2024-03-01 LAB
BKV DNA SERPL NAA+PROBE-LOG#: NORMAL {LOG_COPIES}/ML
LABORATORY COMMENT REPORT: NOT DETECTED

## 2024-03-01 NOTE — PROGRESS NOTES
Lab Review with Dr. Dewey  PTH - 170.2, 143.4, 124.5   Vit-D 34, 35, 18 - On Vit-D 2000 IU daily   Ca 10.2 x 2 10.1   -  Continue to monitor

## 2024-03-08 ENCOUNTER — HOSPITAL ENCOUNTER (OUTPATIENT)
Dept: RADIOLOGY | Facility: CLINIC | Age: 59
Discharge: HOME | End: 2024-03-08
Payer: COMMERCIAL

## 2024-03-08 VITALS — BODY MASS INDEX: 32.46 KG/M2 | HEIGHT: 59 IN | WEIGHT: 161 LBS

## 2024-03-08 DIAGNOSIS — Z01.419 ENCOUNTER FOR ANNUAL ROUTINE GYNECOLOGICAL EXAMINATION: ICD-10-CM

## 2024-03-08 PROCEDURE — 77063 BREAST TOMOSYNTHESIS BI: CPT | Performed by: RADIOLOGY

## 2024-03-08 PROCEDURE — 77067 SCR MAMMO BI INCL CAD: CPT | Performed by: RADIOLOGY

## 2024-03-08 PROCEDURE — 77067 SCR MAMMO BI INCL CAD: CPT

## 2024-03-19 ENCOUNTER — TELEPHONE (OUTPATIENT)
Dept: TRANSPLANT | Facility: HOSPITAL | Age: 59
End: 2024-03-19
Payer: COMMERCIAL

## 2024-03-19 DIAGNOSIS — Z94.0 KIDNEY REPLACED BY TRANSPLANT (HHS-HCC): ICD-10-CM

## 2024-03-19 RX ORDER — TACROLIMUS 0.5 MG/1
0.5 CAPSULE ORAL EVERY 12 HOURS
Qty: 60 CAPSULE | Refills: 11 | Status: SHIPPED | OUTPATIENT
Start: 2024-03-19 | End: 2025-03-19

## 2024-03-19 RX ORDER — TACROLIMUS 1 MG/1
1 CAPSULE ORAL EVERY 12 HOURS
Qty: 60 CAPSULE | Refills: 11 | Status: SHIPPED | OUTPATIENT
Start: 2024-03-19 | End: 2025-03-19

## 2024-03-19 NOTE — TELEPHONE ENCOUNTER
Called patient back, confirmed tac dose of 1.5 mg twice daily, and sent to [facility]

## 2024-03-21 ENCOUNTER — SPECIALTY PHARMACY (OUTPATIENT)
Dept: PHARMACY | Facility: CLINIC | Age: 59
End: 2024-03-21

## 2024-03-21 PROCEDURE — RXMED WILLOW AMBULATORY MEDICATION CHARGE

## 2024-03-25 ENCOUNTER — PHARMACY VISIT (OUTPATIENT)
Dept: PHARMACY | Facility: CLINIC | Age: 59
End: 2024-03-25
Payer: COMMERCIAL

## 2024-03-25 PROCEDURE — RXMED WILLOW AMBULATORY MEDICATION CHARGE

## 2024-03-28 DIAGNOSIS — I10 ESSENTIAL HYPERTENSION: ICD-10-CM

## 2024-03-28 RX ORDER — AMLODIPINE BESYLATE 10 MG/1
10 TABLET ORAL DAILY
Qty: 90 TABLET | Refills: 0 | Status: SHIPPED | OUTPATIENT
Start: 2024-03-28

## 2024-04-17 ENCOUNTER — SPECIALTY PHARMACY (OUTPATIENT)
Dept: PHARMACY | Facility: CLINIC | Age: 59
End: 2024-04-17

## 2024-04-17 PROCEDURE — RXMED WILLOW AMBULATORY MEDICATION CHARGE

## 2024-04-18 ENCOUNTER — PHARMACY VISIT (OUTPATIENT)
Dept: PHARMACY | Facility: CLINIC | Age: 59
End: 2024-04-18
Payer: COMMERCIAL

## 2024-04-22 ENCOUNTER — LAB (OUTPATIENT)
Dept: LAB | Facility: LAB | Age: 59
End: 2024-04-22
Payer: COMMERCIAL

## 2024-04-22 DIAGNOSIS — Z94.0 KIDNEY REPLACED BY TRANSPLANT (HHS-HCC): ICD-10-CM

## 2024-04-22 DIAGNOSIS — E21.0 HYPERPARATHYROID BONE DISEASE (MULTI): ICD-10-CM

## 2024-04-22 LAB
25(OH)D3 SERPL-MCNC: 38 NG/ML (ref 30–100)
ALBUMIN SERPL BCP-MCNC: 4.2 G/DL (ref 3.4–5)
ANION GAP SERPL CALC-SCNC: 15 MMOL/L (ref 10–20)
BUN SERPL-MCNC: 11 MG/DL (ref 6–23)
CALCIUM SERPL-MCNC: 10 MG/DL (ref 8.6–10.6)
CHLORIDE SERPL-SCNC: 104 MMOL/L (ref 98–107)
CO2 SERPL-SCNC: 27 MMOL/L (ref 21–32)
CREAT SERPL-MCNC: 0.78 MG/DL (ref 0.5–1.05)
CREAT UR-MCNC: 59.2 MG/DL (ref 20–320)
EGFRCR SERPLBLD CKD-EPI 2021: 89 ML/MIN/1.73M*2
ERYTHROCYTE [DISTWIDTH] IN BLOOD BY AUTOMATED COUNT: 14.7 % (ref 11.5–14.5)
GLUCOSE SERPL-MCNC: 109 MG/DL (ref 74–99)
HCT VFR BLD AUTO: 39.9 % (ref 36–46)
HGB BLD-MCNC: 11.8 G/DL (ref 12–16)
MCH RBC QN AUTO: 26 PG (ref 26–34)
MCHC RBC AUTO-ENTMCNC: 29.6 G/DL (ref 32–36)
MCV RBC AUTO: 88 FL (ref 80–100)
NRBC BLD-RTO: 0 /100 WBCS (ref 0–0)
PHOSPHATE SERPL-MCNC: 3.4 MG/DL (ref 2.5–4.9)
PLATELET # BLD AUTO: 471 X10*3/UL (ref 150–450)
POTASSIUM SERPL-SCNC: 4.6 MMOL/L (ref 3.5–5.3)
PROT UR-ACNC: 9 MG/DL (ref 5–24)
PROT/CREAT UR: 0.15 MG/MG CREAT (ref 0–0.17)
PTH-INTACT SERPL-MCNC: 176.9 PG/ML (ref 18.5–88)
RBC # BLD AUTO: 4.54 X10*6/UL (ref 4–5.2)
SODIUM SERPL-SCNC: 141 MMOL/L (ref 136–145)
WBC # BLD AUTO: 6.8 X10*3/UL (ref 4.4–11.3)

## 2024-04-22 PROCEDURE — 85027 COMPLETE CBC AUTOMATED: CPT

## 2024-04-22 PROCEDURE — 36415 COLL VENOUS BLD VENIPUNCTURE: CPT

## 2024-04-22 PROCEDURE — 84156 ASSAY OF PROTEIN URINE: CPT

## 2024-04-22 PROCEDURE — 87799 DETECT AGENT NOS DNA QUANT: CPT

## 2024-04-22 PROCEDURE — 82570 ASSAY OF URINE CREATININE: CPT

## 2024-04-22 PROCEDURE — 83970 ASSAY OF PARATHORMONE: CPT

## 2024-04-22 PROCEDURE — 82306 VITAMIN D 25 HYDROXY: CPT

## 2024-04-22 PROCEDURE — 80069 RENAL FUNCTION PANEL: CPT

## 2024-04-22 PROCEDURE — 80197 ASSAY OF TACROLIMUS: CPT

## 2024-04-23 LAB
BKV DNA SERPL NAA+PROBE-LOG#: NORMAL {LOG_COPIES}/ML
LABORATORY COMMENT REPORT: NOT DETECTED
TACROLIMUS BLD-MCNC: 7.6 NG/ML

## 2024-04-24 LAB
ALLOSURE SCORE - KIDNEY: 0.15 %
CAREDX_ORDER_ID: NORMAL
CENTER_ORDER_ID: NORMAL
CLIENT SPECIMEN ID - ALLOSURE: NORMAL
DONOR RELATION - ALLOSURE: NORMAL
NOTES - ALLOSURE: NORMAL
RELATIVE CHANGE VALUE - KIDNEY: NORMAL %
TEST COMMENTS - ALLOSURE: NORMAL
TIME POST TX - ALLOSURE: NORMAL
TRANSPLANTED ORGAN - ALLOSURE: NORMAL
TX DATE - ALLOSURE/ALLOMAP: NORMAL
WP_ORDER_ID: NORMAL

## 2024-05-07 DIAGNOSIS — D84.9 IMMUNOSUPPRESSION (MULTI): Primary | ICD-10-CM

## 2024-05-15 RX ORDER — PREDNISONE 5 MG/1
5 TABLET ORAL DAILY
Qty: 30 TABLET | Refills: 11 | Status: SHIPPED | OUTPATIENT
Start: 2024-05-15 | End: 2025-05-15

## 2024-05-15 RX ORDER — MYCOPHENOLATE MOFETIL 250 MG/1
750 CAPSULE ORAL EVERY 12 HOURS
Qty: 180 CAPSULE | Refills: 11 | Status: SHIPPED | OUTPATIENT
Start: 2024-05-15 | End: 2025-05-15

## 2024-05-16 PROCEDURE — RXMED WILLOW AMBULATORY MEDICATION CHARGE

## 2024-05-20 ENCOUNTER — SPECIALTY PHARMACY (OUTPATIENT)
Dept: PHARMACY | Facility: CLINIC | Age: 59
End: 2024-05-20

## 2024-05-20 PROCEDURE — RXMED WILLOW AMBULATORY MEDICATION CHARGE

## 2024-05-21 ENCOUNTER — PHARMACY VISIT (OUTPATIENT)
Dept: PHARMACY | Facility: CLINIC | Age: 59
End: 2024-05-21
Payer: COMMERCIAL

## 2024-05-23 ENCOUNTER — PATIENT MESSAGE (OUTPATIENT)
Dept: PRIMARY CARE | Facility: CLINIC | Age: 59
End: 2024-05-23
Payer: COMMERCIAL

## 2024-05-23 NOTE — TELEPHONE ENCOUNTER
From: Farzana De La Cruz  To: Alexia Baptiste  Sent: 5/23/2024 12:11 PM EDT  Subject: Hip pain    Hello Dr Baptiste  The past couple of days I have been having pain in my right hip. I have been taking Tylenol but it not working. Is there anything else I can take? Thank you

## 2024-06-19 ENCOUNTER — SPECIALTY PHARMACY (OUTPATIENT)
Dept: PHARMACY | Facility: CLINIC | Age: 59
End: 2024-06-19

## 2024-06-19 PROCEDURE — RXMED WILLOW AMBULATORY MEDICATION CHARGE

## 2024-06-20 ENCOUNTER — PHARMACY VISIT (OUTPATIENT)
Dept: PHARMACY | Facility: CLINIC | Age: 59
End: 2024-06-20
Payer: COMMERCIAL

## 2024-06-24 DIAGNOSIS — I10 ESSENTIAL HYPERTENSION: ICD-10-CM

## 2024-06-24 RX ORDER — AMLODIPINE BESYLATE 10 MG/1
10 TABLET ORAL DAILY
Qty: 90 TABLET | Refills: 3 | Status: SHIPPED | OUTPATIENT
Start: 2024-06-24

## 2024-07-16 ENCOUNTER — APPOINTMENT (OUTPATIENT)
Dept: PRIMARY CARE | Facility: CLINIC | Age: 59
End: 2024-07-16
Payer: COMMERCIAL

## 2024-07-16 VITALS
OXYGEN SATURATION: 100 % | WEIGHT: 160 LBS | BODY MASS INDEX: 32.25 KG/M2 | HEIGHT: 59 IN | HEART RATE: 78 BPM | DIASTOLIC BLOOD PRESSURE: 82 MMHG | RESPIRATION RATE: 15 BRPM | TEMPERATURE: 97.6 F | SYSTOLIC BLOOD PRESSURE: 128 MMHG

## 2024-07-16 DIAGNOSIS — M25.551 PAIN OF RIGHT HIP: Primary | ICD-10-CM

## 2024-07-16 DIAGNOSIS — I10 ESSENTIAL HYPERTENSION: ICD-10-CM

## 2024-07-16 PROBLEM — H52.209 ASTIGMATISM: Status: ACTIVE | Noted: 2023-07-11

## 2024-07-16 PROBLEM — K59.00 CONSTIPATION: Status: ACTIVE | Noted: 2024-07-16

## 2024-07-16 PROBLEM — R52 PAIN, UNSPECIFIED: Status: ACTIVE | Noted: 2022-12-06

## 2024-07-16 PROBLEM — R63.5 WEIGHT GAIN: Status: ACTIVE | Noted: 2024-02-29

## 2024-07-16 PROBLEM — N87.9 CERVICAL ATYPIA: Status: ACTIVE | Noted: 2024-07-16

## 2024-07-16 PROBLEM — D50.9 IRON DEFICIENCY ANEMIA: Status: ACTIVE | Noted: 2024-07-16

## 2024-07-16 PROCEDURE — 3079F DIAST BP 80-89 MM HG: CPT | Performed by: FAMILY MEDICINE

## 2024-07-16 PROCEDURE — 3074F SYST BP LT 130 MM HG: CPT | Performed by: FAMILY MEDICINE

## 2024-07-16 PROCEDURE — 1036F TOBACCO NON-USER: CPT | Performed by: FAMILY MEDICINE

## 2024-07-16 PROCEDURE — 99213 OFFICE O/P EST LOW 20 MIN: CPT | Performed by: FAMILY MEDICINE

## 2024-07-16 NOTE — PROGRESS NOTES
"Subjective   Patient ID: Farzana De La Cruz is a 58 y.o. female who presents for Follow-up (Pt is here for HTN fuv.).    HPI       HTN  BP at goal today in office.  Using medications without issues.  Denies CP, SOB, palpitations, change in vision, dizziness, N/V.    Has been with hip pain on the right - today is ok  Worsens in the cold (winter) as well as with rain    ---Social---  Job:  [employer] working from home  :   Kids: 3  Likes/hobbies:  reading and movies      Review of Systems  All systems reviewed and neg if not noted in the HPI above       Objective   /82 (Patient Position: Sitting)   Pulse 78   Temp 36.4 °C (97.6 °F)   Resp 15   Ht 1.499 m (4' 11\")   Wt 72.6 kg (160 lb)   SpO2 100%   BMI 32.32 kg/m²     Physical Exam    Pleasant  Eyes: conjunctiva non-icteric and eye lids are without obvious rash or drooping. Pupils are symmetric.   Ears, Nose, Mouth, and Throat: External ears and nose appear to be without deformity or rash. No lesions or masses noted. Hearing is grossly intact.   CV: RRR, no murmur  Carotids: no bruits  Pulm:CTA B/L  Abd: soft, NTTP, + BS  LE: no edema  Psychiatric: Alert, orientation to person, place, and time. Recent/remote memory as evidenced through face-to-face interaction and discussion appear grossly intact. Mood and affect are normal.        Assessment/Plan   Problem List Items Addressed This Visit             ICD-10-CM    Essential hypertension  HTN  - Your blood pressure is at goal today- goal is less than 130/80  - Continue your current medications  - Weight loss can help lower your bp!  Work on a healthy whole food diet and add at least 30min of exercise 5 days per week  - Work on a low salt diet   I10    Relevant Orders    TSH with reflex to Free T4 if abnormal     Other Visit Diagnoses         Codes    Pain of right hip    -  Primary  Hip pain  Today we discussed trying hip xray and PT, you declined, but let me know if you change your mind!  - " tylenol 500-1000mg every 6hrs as needed  - Topical creams-  Salonpas, Icy hot, Bio freeze, Capsaicin, Asper cream, or Tiger balm (these are all over the counter)   - home exercises M25.551            Please follow up in  6months for wellness  or as needed.

## 2024-07-16 NOTE — PATIENT INSTRUCTIONS
Hip pain  Today we discussed trying hip xray and PT, you declined, but let me know if you change your mind!  - tylenol 500-1000mg every 6hrs as needed  - Topical creams-  Salonpas, Icy hot, Bio freeze, Capsaicin, Asper cream, or Tiger balm (these are all over the counter)   - home exercises    HTN  - Your blood pressure is at goal today- goal is less than 130/80  - Continue your current medications  - Weight loss can help lower your bp!  Work on a healthy whole food diet and add at least 30min of exercise 5 days per week  - Work on a low salt diet        Please follow up in  6months for wellness  or as needed.       ** If labs or imaging ordered at today's visit, all the non-urgent results will be discussed at your next visit    If you have been referred for a special test or to a specialist please call  [phone] to schedule an appointment.  If you have any further questions, or if develop new or worsened symptoms, please give our office a call at [phone].

## 2024-07-18 ENCOUNTER — SPECIALTY PHARMACY (OUTPATIENT)
Dept: PHARMACY | Facility: CLINIC | Age: 59
End: 2024-07-18

## 2024-07-18 PROCEDURE — RXMED WILLOW AMBULATORY MEDICATION CHARGE

## 2024-07-19 ENCOUNTER — PHARMACY VISIT (OUTPATIENT)
Dept: PHARMACY | Facility: CLINIC | Age: 59
End: 2024-07-19
Payer: COMMERCIAL

## 2024-07-29 ENCOUNTER — APPOINTMENT (OUTPATIENT)
Dept: ENDOCRINOLOGY | Facility: CLINIC | Age: 59
End: 2024-07-29
Payer: COMMERCIAL

## 2024-07-29 VITALS
SYSTOLIC BLOOD PRESSURE: 123 MMHG | RESPIRATION RATE: 20 BRPM | WEIGHT: 158 LBS | HEART RATE: 81 BPM | HEIGHT: 59 IN | TEMPERATURE: 97.5 F | BODY MASS INDEX: 31.85 KG/M2 | DIASTOLIC BLOOD PRESSURE: 78 MMHG

## 2024-07-29 DIAGNOSIS — T50.905A WEIGHT GAIN DUE TO MEDICATION: ICD-10-CM

## 2024-07-29 DIAGNOSIS — E66.9 CLASS 1 OBESITY WITH BODY MASS INDEX (BMI) OF 32.0 TO 32.9 IN ADULT, UNSPECIFIED OBESITY TYPE, UNSPECIFIED WHETHER SERIOUS COMORBIDITY PRESENT: Primary | ICD-10-CM

## 2024-07-29 DIAGNOSIS — I10 ESSENTIAL HYPERTENSION: ICD-10-CM

## 2024-07-29 DIAGNOSIS — Z94.0 KIDNEY REPLACED BY TRANSPLANT (HHS-HCC): ICD-10-CM

## 2024-07-29 DIAGNOSIS — E11.9 CONTROLLED TYPE 2 DIABETES MELLITUS WITHOUT COMPLICATION, WITHOUT LONG-TERM CURRENT USE OF INSULIN (MULTI): ICD-10-CM

## 2024-07-29 DIAGNOSIS — R63.5 WEIGHT GAIN DUE TO MEDICATION: ICD-10-CM

## 2024-07-29 PROCEDURE — 1036F TOBACCO NON-USER: CPT | Performed by: NURSE PRACTITIONER

## 2024-07-29 PROCEDURE — 99204 OFFICE O/P NEW MOD 45 MIN: CPT | Performed by: NURSE PRACTITIONER

## 2024-07-29 PROCEDURE — 3061F NEG MICROALBUMINURIA REV: CPT | Performed by: NURSE PRACTITIONER

## 2024-07-29 PROCEDURE — 3048F LDL-C <100 MG/DL: CPT | Performed by: NURSE PRACTITIONER

## 2024-07-29 PROCEDURE — 3044F HG A1C LEVEL LT 7.0%: CPT | Performed by: NURSE PRACTITIONER

## 2024-07-29 PROCEDURE — 3008F BODY MASS INDEX DOCD: CPT | Performed by: NURSE PRACTITIONER

## 2024-07-29 PROCEDURE — 3074F SYST BP LT 130 MM HG: CPT | Performed by: NURSE PRACTITIONER

## 2024-07-29 PROCEDURE — 3078F DIAST BP <80 MM HG: CPT | Performed by: NURSE PRACTITIONER

## 2024-07-29 ASSESSMENT — ENCOUNTER SYMPTOMS
POLYPHAGIA: 0
ABDOMINAL DISTENTION: 0
EYE PAIN: 0
DIZZINESS: 0
COUGH: 0
DIARRHEA: 0
SPEECH DIFFICULTY: 0
BACK PAIN: 0
RHINORRHEA: 0
FLANK PAIN: 0
POLYDIPSIA: 0
EYE DISCHARGE: 0
HEADACHES: 0
NUMBNESS: 0
CONSTIPATION: 0
ACTIVITY CHANGE: 0
FREQUENCY: 0
PHOTOPHOBIA: 0
FATIGUE: 0

## 2024-07-29 ASSESSMENT — PATIENT HEALTH QUESTIONNAIRE - PHQ9
2. FEELING DOWN, DEPRESSED OR HOPELESS: NOT AT ALL
1. LITTLE INTEREST OR PLEASURE IN DOING THINGS: NOT AT ALL
SUM OF ALL RESPONSES TO PHQ9 QUESTIONS 1 AND 2: 0

## 2024-07-29 NOTE — PROGRESS NOTES
Farzana De La Cruz presents for weight loss management and obesity consult today. Referred into this clinic by her kidney transplant team for weight management. Her transplant was 3/2022.  This was done subsequent to high blood pressure and hereditary.    Obesity History:  Childhood or teen obesity history: no  Period of greatest weight gain: after her transplant, gained 30 pounds post the transplant, she was on prednisone for transplant purpose  Age of Onset: mid adult years  Lowest adult weight: 120  Highest adult weight: 158  Current weight: 158     Family history of obesity:  none  Biggest challenge with weight management:   Keeping weight off post transplant with having to take post medications known for weight loss    History of Weight Loss Efforts: yes  Successful weight loss techniques attempted: nutritionist consultation  Unsuccessful weight loss techniques attempted:  n/a    Current typical daily diet:  Typical Breakfast: skip  Typical Lunch: fruit, yogurt, gluten free muffins, oatmeal  Typical Dinner: vegetables, protein (chicken baked)  Soda/latte weekly intake: ginger ale. No coffee intake  Take out/carry out/fast food weekly: 3 times a month  Portion sizes/seconds with meals: similar ADA    Snacking  Daytime snacks: yes  Evening snacks: yes, until recently      Describe Appetite (always hungry/no hunger/average):  How satiated (full) are you after a meal? yes  Emotional eater? No   Boredom eater? Yes     Current Exercise Habits cardiovascular workout on exercise equipment started yesterday    Sleep patterns (insomnia, waking in night) /hours of sleep per night: 6-8 hours  H/O Sleep apnea: No  Well rested? Yes     Increased Stressors (describe daily stress): no      Any intake of an appetite suppressant or an anti-obesity medicine? 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 carrier of methicillin resistant Staphylococcus aureus 11/24/2021    MRSA carrier   • Chronic kidney disease, stage 4 (severe) (Multi) 01/21/2022    CKD stage G4/A3, GFR 15-29 and albumin creatinine ratio >300 mg/g   • Chronic kidney disease, stage 5 (Multi) 04/07/2022    CKD (chronic kidney disease) stage 5, GFR less than 15 ml/min   • Cramp and spasm 10/07/2020    Nocturnal muscle cramp   • Dependence on renal dialysis (CMS-HCC) 01/21/2022    Peritoneal dialysis status   • Drug induced constipation 03/09/2022    Drug-induced constipation   • Encounter for general adult medical examination without abnormal findings 07/25/2017    Well adult exam   • Encounter for other preprocedural examination 03/09/2022    Pre-transplant evaluation for kidney transplant   • Encounter for other preprocedural examination 03/09/2022    Preop testing   • Encounter for other screening for malignant neoplasm of breast 03/09/2022    Breast cancer screening   • Encounter for pregnancy test, result negative 03/09/2022    Urine pregnancy test negative   • End stage renal disease (Multi) 01/21/2022    ESRD on dialysis   • Essential (primary) hypertension 10/26/2022    Hypertension   • Other acute postprocedural pain 03/09/2022    Post-operative pain   • Other disorders of phosphorus metabolism 06/28/2021    Hyperphosphatemia   • Other specified counseling 12/05/2018    Grief counseling   • Pain in right leg 06/25/2019    Pain of right lower extremity   • Personal history of other diseases of the digestive system 01/12/2022    History of constipation   • Personal  history of other medical treatment 06/10/2019    History of screening mammography   • Shortness of breath 04/21/2022    SOB (shortness of breath) on exertion   • Unspecified astigmatism, bilateral 09/02/2022    Astigmatism of both eyes       Current Outpatient Medications   Medication Sig Dispense Refill   • acetaminophen (Tylenol) 325 mg tablet Take by mouth every 6 hours if needed.     • amLODIPine (Norvasc) 10 mg tablet TAKE 1 TABLET BY MOUTH EVERY DAY AS DIRECTED 90 tablet 3   • aspirin 81 mg EC tablet Take 1 tablet (81 mg) by mouth once daily.     • carvedilol (Coreg) 6.25 mg tablet Take by mouth twice a day.     • cholecalciferol (Vitamin D-3) 50 mcg (2,000 unit) capsule Take 1 capsule (2,000 Units) by mouth once daily. 90 capsule 3   • cyanocobalamin (Vitamin B-12) 500 mcg tablet Take by mouth.     • magnesium oxide (Mag-Ox) 400 mg (241.3 mg magnesium) tablet Take 1 tablet (400 mg) by mouth 2 times a day. 180 tablet 3   • mycophenolate (Cellcept) 250 mg capsule TAKE THREE (3) CAPSULES BY MOUTH EVERY 12 HOURS. 180 capsule 11   • pantoprazole (ProtoNix) 40 mg EC tablet Take 1 tablet (40 mg) by mouth once daily.     • predniSONE (Deltasone) 5 mg tablet TAKE ONE (1) TABLET BY MOUTH ONCE DAILY. 30 tablet 11   • tacrolimus (Prograf) 0.5 mg capsule Take 1 capsule (0.5 mg) by mouth every 12 hours. 60 capsule 11   • tacrolimus (Prograf) 1 mg capsule Take 1 capsule (1 mg) by mouth every 12 hours. 60 capsule 11     No current facility-administered medications for this visit.           Review of Systems  Review of Systems   Constitutional:  Negative for activity change and fatigue.   HENT:  Negative for congestion, dental problem, ear pain, mouth sores and rhinorrhea.    Eyes:  Negative for photophobia, pain and discharge.   Respiratory:  Negative for cough.    Cardiovascular:  Negative for leg swelling.   Gastrointestinal:  Negative for abdominal distention, constipation and diarrhea.   Endocrine: Negative for  "polydipsia, polyphagia and polyuria.   Genitourinary:  Negative for flank pain, frequency and urgency.   Musculoskeletal:  Negative for back pain.   Skin:  Negative for pallor and rash.   Neurological:  Negative for dizziness, speech difficulty, numbness and headaches.   Psychiatric/Behavioral:  Negative for behavioral problems.            Objective   /78 (BP Location: Right arm, Patient Position: Sitting, BP Cuff Size: Adult)   Pulse 81   Temp 36.4 °C (97.5 °F) (Tympanic)   Resp 20   Ht 1.499 m (4' 11\")   Wt 71.7 kg (158 lb)   BMI 31.91 kg/m²     Physical Exam  Physical Exam  Vitals reviewed.   Constitutional:       General: She is not in acute distress.     Appearance: Normal appearance. She is obese.   Neck:      Comments: Thyroid exam is normal, no palpable nodules  No goiter is noted  Cardiovascular:      Rate and Rhythm: Normal rate and regular rhythm.   Pulmonary:      Effort: Pulmonary effort is normal.      Breath sounds: Normal breath sounds.   Skin:     General: Skin is warm and dry.   Neurological:      Mental Status: She is alert and oriented to person, place, and time.   Psychiatric:         Mood and Affect: Mood normal.         Behavior: Behavior normal.         Thought Content: Thought content normal.         Judgment: Judgment normal.       Lab Review  Lab Results   Component Value Date    HGBA1C 6.6 (H) 02/29/2024     Lab Results   Component Value Date    LDLCALC 95 02/29/2024       Weight Trends  Trends of weight reviewed in visit, see graph below:    Assessment/Plan     Follow up and Goals:  Physical fitness: continue to focus on incorporating you exercise routine into your routine and habits. Ancestryube videos: 20- 30 minutes (HasFit,, Burn Boot camp (modifications), free weights 12 pounds, resistance bands). Goal 150 minutes in a week or 4-5 days a week.  Consult with Marline, look over Mediterranean material  Look over GLP1 worksheet, let me know if this is a direction you desire to " take  ....      Problem List Items Addressed This Visit       Essential hypertension    Kidney replaced by transplant ([facility])     Other Visit Diagnoses       Class 1 obesity with body mass index (BMI) of 32.0 to 32.9 in adult, unspecified obesity type, unspecified whether serious comorbidity present    -  Primary    Relevant Orders    Referral to Nutrition Services    Weight gain due to medication        Controlled type 2 diabetes mellitus without complication, without long-term current use of insulin (Multi)                Diet interventions:  mediterranean  Discussed Mediterranean Diet, given pamphlet or it will be mailed, we looked at ADA plate, portion sizes, recipes. Advised to review in preparation for nutrition consult    Handouts given:  yes    Exercise intervention:   We discussed the importance of incorporating resistance and free weight  lifting into physical activity routine to prevent muscle wasting with weight loss, enhance bone health, the positive role increased muscle has in burning fat at rest. We looked at examples of these types of exercise routines online. Advised to do modifications. Advised to check with PCP if there is a h/o musculoskeletal injury or hx. We discussed benefits of walking with weight loss and as a cardio form of exercise. Gradually increase exercise to a goal of 150 minutes at least per week.      Follow Up:  Referred to nutrition or continue to work with current dietitian   Discussed obesity medications, side effects and mechanism of action reviewed with patient  Discussed physical activity: we reviewed Youtube videos for strength training, advised to use modifications for these videos, we discussed benefits of strength training and resistance bands  on bone and muscle health, we discussed walking and water aerobic options for cardio  Discussed Mediterranean Diet, given pamphlet or it will be mailed, we looked at ADA plate, portion sizes, recipes. Advised to review  Mediterranean Meal Plan booklet  in preparation for nutrition consult  ....  1 month group visit and nutrition visit in person or  virtual  Please reach out if you need anything or have further questions

## 2024-07-29 NOTE — PATIENT INSTRUCTIONS
Physical fitness: continue to focus on incorporating you exercise routine into your routine and habits. Youtube videos: 20- 30 minutes (HasFit,, Burn Boot camp (modifications), free weights 12 pounds, resistance bands)  Goal 150 minutes in a week or 4-5 days a week.  Nutrition: Consult with Marline, look over Mediterranean material  Medications:Look over GLP1 worksheet, let me know if this is a direction you desire to take

## 2024-07-30 ENCOUNTER — APPOINTMENT (OUTPATIENT)
Dept: ENDOCRINOLOGY | Facility: CLINIC | Age: 59
End: 2024-07-30
Payer: COMMERCIAL

## 2024-08-10 ENCOUNTER — LAB (OUTPATIENT)
Dept: LAB | Facility: LAB | Age: 59
End: 2024-08-10
Payer: COMMERCIAL

## 2024-08-10 DIAGNOSIS — Z94.0 KIDNEY REPLACED BY TRANSPLANT (HHS-HCC): ICD-10-CM

## 2024-08-10 DIAGNOSIS — I10 ESSENTIAL HYPERTENSION: ICD-10-CM

## 2024-08-10 LAB
CREAT UR-MCNC: 83.4 MG/DL (ref 20–320)
PROT UR-ACNC: 7 MG/DL (ref 5–24)
PROT/CREAT UR: 0.08 MG/MG CREAT (ref 0–0.17)
TSH SERPL-ACNC: 2.08 MIU/L (ref 0.44–3.98)

## 2024-08-10 PROCEDURE — 36415 COLL VENOUS BLD VENIPUNCTURE: CPT

## 2024-08-10 PROCEDURE — 84156 ASSAY OF PROTEIN URINE: CPT

## 2024-08-10 PROCEDURE — 82570 ASSAY OF URINE CREATININE: CPT

## 2024-08-10 PROCEDURE — 87799 DETECT AGENT NOS DNA QUANT: CPT

## 2024-08-10 PROCEDURE — 84443 ASSAY THYROID STIM HORMONE: CPT

## 2024-08-11 LAB
BKV DNA SERPL NAA+PROBE-LOG#: NORMAL {LOG_COPIES}/ML
LABORATORY COMMENT REPORT: NOT DETECTED

## 2024-08-14 ENCOUNTER — LAB (OUTPATIENT)
Dept: LAB | Facility: LAB | Age: 59
End: 2024-08-14
Payer: COMMERCIAL

## 2024-08-14 DIAGNOSIS — Z94.0 KIDNEY REPLACED BY TRANSPLANT (HHS-HCC): ICD-10-CM

## 2024-08-14 LAB
ALBUMIN SERPL BCP-MCNC: 4.5 G/DL (ref 3.4–5)
ANION GAP SERPL CALC-SCNC: 16 MMOL/L (ref 10–20)
BUN SERPL-MCNC: 12 MG/DL (ref 6–23)
CALCIUM SERPL-MCNC: 10.1 MG/DL (ref 8.6–10.6)
CHLORIDE SERPL-SCNC: 103 MMOL/L (ref 98–107)
CO2 SERPL-SCNC: 27 MMOL/L (ref 21–32)
CREAT SERPL-MCNC: 0.82 MG/DL (ref 0.5–1.05)
EGFRCR SERPLBLD CKD-EPI 2021: 84 ML/MIN/1.73M*2
ERYTHROCYTE [DISTWIDTH] IN BLOOD BY AUTOMATED COUNT: 15.1 % (ref 11.5–14.5)
GLUCOSE SERPL-MCNC: 101 MG/DL (ref 74–99)
HCT VFR BLD AUTO: 40.9 % (ref 36–46)
HGB BLD-MCNC: 12.3 G/DL (ref 12–16)
MCH RBC QN AUTO: 26.5 PG (ref 26–34)
MCHC RBC AUTO-ENTMCNC: 30.1 G/DL (ref 32–36)
MCV RBC AUTO: 88 FL (ref 80–100)
NRBC BLD-RTO: 0 /100 WBCS (ref 0–0)
PHOSPHATE SERPL-MCNC: 4.1 MG/DL (ref 2.5–4.9)
PLATELET # BLD AUTO: 502 X10*3/UL (ref 150–450)
POTASSIUM SERPL-SCNC: 4.8 MMOL/L (ref 3.5–5.3)
RBC # BLD AUTO: 4.65 X10*6/UL (ref 4–5.2)
SODIUM SERPL-SCNC: 141 MMOL/L (ref 136–145)
TACROLIMUS BLD-MCNC: 8.4 NG/ML
WBC # BLD AUTO: 9.5 X10*3/UL (ref 4.4–11.3)

## 2024-08-14 PROCEDURE — 80069 RENAL FUNCTION PANEL: CPT

## 2024-08-14 PROCEDURE — 36415 COLL VENOUS BLD VENIPUNCTURE: CPT

## 2024-08-14 PROCEDURE — 85027 COMPLETE CBC AUTOMATED: CPT

## 2024-08-14 PROCEDURE — 80197 ASSAY OF TACROLIMUS: CPT

## 2024-08-15 ENCOUNTER — TELEPHONE (OUTPATIENT)
Dept: TRANSPLANT | Facility: HOSPITAL | Age: 59
End: 2024-08-15

## 2024-08-15 ENCOUNTER — OFFICE VISIT (OUTPATIENT)
Dept: TRANSPLANT | Facility: HOSPITAL | Age: 59
End: 2024-08-15
Payer: COMMERCIAL

## 2024-08-15 VITALS
OXYGEN SATURATION: 98 % | DIASTOLIC BLOOD PRESSURE: 90 MMHG | HEART RATE: 79 BPM | WEIGHT: 156.4 LBS | TEMPERATURE: 97.4 F | SYSTOLIC BLOOD PRESSURE: 138 MMHG | BODY MASS INDEX: 31.59 KG/M2

## 2024-08-15 DIAGNOSIS — Z94.0 IMMUNOSUPPRESSIVE MANAGEMENT ENCOUNTER FOLLOWING KIDNEY TRANSPLANT (HHS-HCC): ICD-10-CM

## 2024-08-15 DIAGNOSIS — Z94.0 KIDNEY REPLACED BY TRANSPLANT (HHS-HCC): Primary | ICD-10-CM

## 2024-08-15 DIAGNOSIS — M81.8 OTHER OSTEOPOROSIS, UNSPECIFIED PATHOLOGICAL FRACTURE PRESENCE: ICD-10-CM

## 2024-08-15 DIAGNOSIS — Z94.0 KIDNEY REPLACED BY TRANSPLANT (HHS-HCC): ICD-10-CM

## 2024-08-15 DIAGNOSIS — Z79.899 IMMUNOSUPPRESSIVE MANAGEMENT ENCOUNTER FOLLOWING KIDNEY TRANSPLANT (HHS-HCC): ICD-10-CM

## 2024-08-15 DIAGNOSIS — I10 ESSENTIAL HYPERTENSION: ICD-10-CM

## 2024-08-15 DIAGNOSIS — E55.9 VITAMIN D DEFICIENCY: ICD-10-CM

## 2024-08-15 PROCEDURE — 3075F SYST BP GE 130 - 139MM HG: CPT | Performed by: INTERNAL MEDICINE

## 2024-08-15 PROCEDURE — 99214 OFFICE O/P EST MOD 30 MIN: CPT | Performed by: INTERNAL MEDICINE

## 2024-08-15 PROCEDURE — 3080F DIAST BP >= 90 MM HG: CPT | Performed by: INTERNAL MEDICINE

## 2024-08-15 RX ORDER — TACROLIMUS 0.5 MG/1
0.5 CAPSULE ORAL EVERY MORNING
Qty: 90 CAPSULE | Refills: 3 | Status: SHIPPED | OUTPATIENT
Start: 2024-08-15 | End: 2024-08-15 | Stop reason: SDUPTHER

## 2024-08-15 RX ORDER — TACROLIMUS 1 MG/1
1 CAPSULE ORAL EVERY 12 HOURS
Qty: 180 CAPSULE | Refills: 3 | Status: SHIPPED | OUTPATIENT
Start: 2024-08-15 | End: 2024-08-15 | Stop reason: SDUPTHER

## 2024-08-15 RX ORDER — LANOLIN ALCOHOL/MO/W.PET/CERES
1 CREAM (GRAM) TOPICAL 2 TIMES DAILY
Qty: 180 TABLET | Refills: 3 | Status: SHIPPED | OUTPATIENT
Start: 2024-08-15 | End: 2025-08-15

## 2024-08-15 ASSESSMENT — PAIN SCALES - GENERAL: PAINLEVEL: 0-NO PAIN

## 2024-08-15 NOTE — PROGRESS NOTES
TRANSPLANT NEPHROLOGY :   OUTPATIENT CLINIC NOTE      SERVICE DATE : 08/15/2024    REASON FOR VISIT/CHIEF COMPLAINT:  S/P  TRANSPLANT SURGERY  IMMUNOSUPPRESSIVE MEDICATION MANAGEMENT  BLOOD PRESSURE MANAGEMENT    HPI:    Ms. De La Cruz is a 58 y.o. female with past medical history significant for ESRD secondary to hypertensive nephrosclerosis s/p living donor kidney transplant on 3/8/2022. Patient's and donor EBV status is D negative/R positive and CMV status D-/R+. Patient was induced by Simulect and maintained on triple immunosuppression. Patient have spontaneous kidney function and did not require any dialysis post transplant her PD catheter removed along with the stent removal on 3/24/2022.       Patient is here for follow up s/p kidney transplant.    Patient is doing well overall. No new complaints. Denied chest pain, SOB, SELBY, Palpitation. Normal urination and bowel movement. Normal gait and no weakness of arms/legs. No cough, runny nose, sore throat, cold symptoms, or rash. No hearing loss. Normal vision.No problems with his sleep, mood and function. No recent infection, hospitalization, surgery or ER visits.      ROS:  Review of  14 systems was performed system by system. See HPI. Otherwise, the symptoms were negative.    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 11/24/2021    MRSA carrier    Chronic kidney disease, stage 4 (severe) (Multi) 01/21/2022    CKD stage G4/A3, GFR 15-29 and  albumin creatinine ratio >300 mg/g    Chronic kidney disease, stage 5 (Multi) 2022    CKD (chronic kidney disease) stage 5, GFR less than 15 ml/min    Cramp and spasm 10/07/2020    Nocturnal muscle cramp    Dependence on renal dialysis (CMS-[facility]) 2022    Peritoneal dialysis status    Drug induced constipation 2022    Drug-induced constipation    Encounter for general adult medical examination without abnormal findings 2017    Well adult exam    Encounter for other preprocedural examination 2022    Pre-transplant evaluation for kidney transplant    Encounter for other preprocedural examination 2022    Preop testing    Encounter for other screening for malignant neoplasm of breast 2022    Breast cancer screening    Encounter for pregnancy test, result negative 2022    Urine pregnancy test negative    End stage renal disease (Multi) 2022    ESRD on dialysis    Essential (primary) hypertension 10/26/2022    Hypertension    Other acute postprocedural pain 2022    Post-operative pain    Other disorders of phosphorus metabolism 2021    Hyperphosphatemia    Other specified counseling 2018    Grief counseling    Pain in right leg 2019    Pain of right lower extremity    Personal history of other diseases of the digestive system 2022    History of constipation    Personal history of other medical treatment 06/10/2019    History of screening mammography    Shortness of breath 2022    SOB (shortness of breath) on exertion    Unspecified astigmatism, bilateral 2022    Astigmatism of both eyes        PAST SURGICAL HISTORY:  Past Surgical History:   Procedure Laterality Date     SECTION, CLASSIC  2018     Section    MOUTH SURGERY  2018    Oral Surgery Tooth Extraction    OTHER SURGICAL HISTORY  2017    Conclusion Of Operation Implants Mesh    OTHER SURGICAL HISTORY  2022    Kidney transplantation     TUBAL LIGATION  02/02/2018    Tubal Ligation        SOCIAL HISTORY:  Social History     Socioeconomic History    Marital status:      Spouse name: Not on file    Number of children: Not on file    Years of education: Not on file    Highest education level: Not on file   Occupational History    Not on file   Tobacco Use    Smoking status: Never    Smokeless tobacco: Never   Substance and Sexual Activity    Alcohol use: Never    Drug use: Never    Sexual activity: Not on file   Other Topics Concern    Not on file   Social History Narrative    Not on file     Social Determinants of Health     Financial Resource Strain: Not on file   Food Insecurity: Not on file   Transportation Needs: Not on file   Physical Activity: Not on file   Stress: Not on file   Social Connections: Not on file   Intimate Partner Violence: Not on file   Housing Stability: Not on file       FAMILY HISTORY:  Family History   Problem Relation Name Age of Onset    Breast cancer Maternal Grandmother      Glaucoma Maternal Grandfather      Breast cancer Mother's Sister         MEDICATION LIST:  Current Outpatient Medications   Medication Instructions    acetaminophen (Tylenol) 325 mg tablet oral, Every 6 hours PRN    amLODIPine (NORVASC) 10 mg, oral, Daily, as directed    aspirin 81 mg EC tablet 1 tablet, oral, Daily    carvedilol (Coreg) 6.25 mg tablet oral, 2 times daily    cholecalciferol (VITAMIN D-3) 2,000 Units, oral, Daily    cyanocobalamin (Vitamin B-12) 500 mcg tablet oral    magnesium oxide (MAG-OX) 400 mg, oral, 2 times daily    mycophenolate (Cellcept) 250 mg capsule TAKE THREE (3) CAPSULES BY MOUTH EVERY 12 HOURS.    pantoprazole (ProtoNix) 40 mg EC tablet 1 tablet, oral, Daily    predniSONE (Deltasone) 5 mg tablet TAKE ONE (1) TABLET BY MOUTH ONCE DAILY.    tacrolimus (PROGRAF) 0.5 mg, oral, Every 12 hours    tacrolimus (PROGRAF) 1 mg, oral, Every 12 hours       ALLERGY  Allergies   Allergen Reactions    Egg Unknown    Latex  "Itching, Rash and Unknown    Senna Other     cramps       PHYSICAL EXAM:    Visit Vitals  /90   Pulse 79   Temp 36.3 °C (97.4 °F) (Temporal)   Wt 70.9 kg (156 lb 6.4 oz)   SpO2 98%   BMI 31.59 kg/m²   OB Status Postmenopausal   Smoking Status Never   BSA 1.72 m²          Vital signs - reviewed. Acceptable BP at this office visit.   General Appearance - NAD, Good speech, oriented and alert  HEENT - Supple. Not pale. No jaundice. No cervical lymphadenopathy. Pharynx and tonsils are not injected.  CVS - RRR. Normal S1/S2. No murmur, click , rub or gallop  Lungs- clear to auscultation bilaterally  Abdomen - soft , not tender, no guarding, no rigidity. No hepatosplenomegaly. Normal bowel sounds. No masses and ascites. S/P Kidney transplant .  Transplanted kidney is not tender.   Musculoskeletal /Extremities - no edema. Full ROM. No joint tenderness.   Neuro/Psych - appropriate mood and affect. Motor power V/V all extremities. CN I -XII were grossly intact.  Skin - No visible rash      LABS:    Lab Results   Component Value Date    WBC 9.5 08/14/2024    HGB 12.3 08/14/2024    HCT 40.9 08/14/2024     (H) 08/14/2024    CHOL 192 02/29/2024    TRIG 111 02/29/2024    HDL 74.6 02/29/2024    LDLDIRECT 104 07/12/2023    ALT 16 12/05/2022    AST 16 12/05/2022     08/14/2024    K 4.8 08/14/2024     08/14/2024    CREATININE 0.82 08/14/2024    BUN 12 08/14/2024    CO2 27 08/14/2024    TSH 2.08 08/10/2024    INR 1.0 03/08/2022    HGBA1C 6.6 (H) 02/29/2024     par    ASSESSMENT AND PLAN:    Ms. De La Cruz is a 58 y.o. female  who is here for follow up s/p kidney transplant.    TRANSPLANT DATE: 3/8/2022 (Kidney)      1. ESRD S/P kidney transplant   - Creatinine last check was :  Lab Results   Component Value Date    CREATININE 0.82 08/14/2024      No results found for: \"GFRMALE\", \"NONUHFIRE\"      - Renal allograft function is stable  -Ensure adequate hydration  - Avoid nephrotoxic medications, NSAIDs, and IV " contrast.    2. Immunosuppression  -Tacrolimus level last check was 8+, would cut back on the dosage to 1.5 mg in am and 1 mg in pm. Recheck level in a week.  -Continue current immunosuppression regimen.    3. Electrolytes  Lab Results   Component Value Date    GLUCOSE 101 (H) 08/14/2024    CALCIUM 10.1 08/14/2024     08/14/2024    K 4.8 08/14/2024    CO2 27 08/14/2024     08/14/2024    BUN 12 08/14/2024    CREATININE 0.82 08/14/2024     -Acceptable from last lab drawn    4. Hypertension  Blood Pressures         8/15/2024  1011             BP: 138/90          -Home  BP had been acceptable  -Encourage to monitor home BP  -Continue current anti hypertensive medication    5. Bone Mineral Disease/Osteoporosis  Lab Results   Component Value Date    .9 (H) 04/22/2024    CALCIUM 10.1 08/14/2024    PHOS 4.1 08/14/2024    VITD25 38 04/22/2024     -check VIT D, PTH with next lab  - Consider DEXA every 2-3 years , defer to PCP    6.Anemia  Lab Results   Component Value Date    WBC 9.5 08/14/2024    HGB 12.3 08/14/2024    HCT 40.9 08/14/2024    MCV 88 08/14/2024     (H) 08/14/2024     Lab Results   Component Value Date    IRON 49 03/11/2022    TIBC 209 (L) 03/11/2022    FERRITIN 436 (H) 03/11/2022     -asymptomatic  - Continue to monitor  -check iron studies and ferritin. Will consider DARIEN as needed.  - No indications for blood transfusion     7.Health maintenance and vaccination  - Flu shot during flu season annually  - Cancer screening is up to date per the patient    Summary  -cut back on the dosage to 1.5 mg in am and 1 mg in pm. Recheck level in a week. If level is at goal ~4-6, can resume lab q 3 months  -Add on VIT D, PTH to next lab  -continue to work with endo/weight management. She has lost 3 Kg from our last visit in Feb.  - DEXA  - See PCP for age appropriate cancer screening  Next follow up in 6 months    Danette Garrido    Transplant Nephrology

## 2024-08-15 NOTE — TELEPHONE ENCOUNTER
Arpan called and clarified which specialty pharmacy the patient wanted [facility] for tac refills.  Refills sent, pending Dr. Smith signature.

## 2024-08-15 NOTE — PATIENT INSTRUCTIONS
Change tac to 1.5 mg in the morning and 1 mg in the evening   Labs in 1 week, then labs every 3 months.   Return to clinic in 6 months.

## 2024-08-16 RX ORDER — TACROLIMUS 1 MG/1
1 CAPSULE ORAL EVERY 12 HOURS
Qty: 180 CAPSULE | Refills: 3 | Status: SHIPPED | OUTPATIENT
Start: 2024-08-16 | End: 2025-08-16

## 2024-08-16 RX ORDER — TACROLIMUS 0.5 MG/1
0.5 CAPSULE ORAL EVERY MORNING
Qty: 90 CAPSULE | Refills: 3 | Status: SHIPPED | OUTPATIENT
Start: 2024-08-16 | End: 2025-08-16

## 2024-08-19 ENCOUNTER — SPECIALTY PHARMACY (OUTPATIENT)
Dept: PHARMACY | Facility: CLINIC | Age: 59
End: 2024-08-19

## 2024-08-19 ENCOUNTER — PHARMACY VISIT (OUTPATIENT)
Dept: PHARMACY | Facility: CLINIC | Age: 59
End: 2024-08-19
Payer: COMMERCIAL

## 2024-08-19 PROCEDURE — RXMED WILLOW AMBULATORY MEDICATION CHARGE

## 2024-08-23 ENCOUNTER — LAB (OUTPATIENT)
Dept: LAB | Facility: LAB | Age: 59
End: 2024-08-23
Payer: COMMERCIAL

## 2024-08-23 DIAGNOSIS — E55.9 VITAMIN D DEFICIENCY: ICD-10-CM

## 2024-08-23 DIAGNOSIS — Z94.0 KIDNEY REPLACED BY TRANSPLANT (HHS-HCC): ICD-10-CM

## 2024-08-23 LAB
25(OH)D3 SERPL-MCNC: 43 NG/ML (ref 30–100)
ALBUMIN SERPL BCP-MCNC: 4.2 G/DL (ref 3.4–5)
ANION GAP SERPL CALC-SCNC: 13 MMOL/L (ref 10–20)
BUN SERPL-MCNC: 15 MG/DL (ref 6–23)
CALCIUM SERPL-MCNC: 10.1 MG/DL (ref 8.6–10.6)
CHLORIDE SERPL-SCNC: 104 MMOL/L (ref 98–107)
CO2 SERPL-SCNC: 29 MMOL/L (ref 21–32)
CREAT SERPL-MCNC: 0.83 MG/DL (ref 0.5–1.05)
EGFRCR SERPLBLD CKD-EPI 2021: 83 ML/MIN/1.73M*2
ERYTHROCYTE [DISTWIDTH] IN BLOOD BY AUTOMATED COUNT: 15 % (ref 11.5–14.5)
GLUCOSE SERPL-MCNC: 105 MG/DL (ref 74–99)
HCT VFR BLD AUTO: 39.8 % (ref 36–46)
HGB BLD-MCNC: 11.9 G/DL (ref 12–16)
MAGNESIUM SERPL-MCNC: 1.96 MG/DL (ref 1.6–2.4)
MCH RBC QN AUTO: 26.5 PG (ref 26–34)
MCHC RBC AUTO-ENTMCNC: 29.9 G/DL (ref 32–36)
MCV RBC AUTO: 89 FL (ref 80–100)
NRBC BLD-RTO: 0 /100 WBCS (ref 0–0)
PHOSPHATE SERPL-MCNC: 4.1 MG/DL (ref 2.5–4.9)
PLATELET # BLD AUTO: 481 X10*3/UL (ref 150–450)
POTASSIUM SERPL-SCNC: 4.6 MMOL/L (ref 3.5–5.3)
PTH-INTACT SERPL-MCNC: 159.3 PG/ML (ref 18.5–88)
RBC # BLD AUTO: 4.49 X10*6/UL (ref 4–5.2)
SODIUM SERPL-SCNC: 141 MMOL/L (ref 136–145)
TACROLIMUS BLD-MCNC: 7 NG/ML
WBC # BLD AUTO: 9.3 X10*3/UL (ref 4.4–11.3)

## 2024-08-23 PROCEDURE — 36415 COLL VENOUS BLD VENIPUNCTURE: CPT

## 2024-08-23 PROCEDURE — 83970 ASSAY OF PARATHORMONE: CPT

## 2024-08-23 PROCEDURE — 80197 ASSAY OF TACROLIMUS: CPT

## 2024-08-23 PROCEDURE — 82306 VITAMIN D 25 HYDROXY: CPT

## 2024-08-23 PROCEDURE — 85027 COMPLETE CBC AUTOMATED: CPT

## 2024-08-23 PROCEDURE — 83735 ASSAY OF MAGNESIUM: CPT

## 2024-08-23 PROCEDURE — 80069 RENAL FUNCTION PANEL: CPT

## 2024-09-02 DIAGNOSIS — I10 ESSENTIAL (PRIMARY) HYPERTENSION: ICD-10-CM

## 2024-09-04 DIAGNOSIS — Z94.0 KIDNEY REPLACED BY TRANSPLANT (HHS-HCC): ICD-10-CM

## 2024-09-05 ENCOUNTER — APPOINTMENT (OUTPATIENT)
Dept: ENDOCRINOLOGY | Facility: CLINIC | Age: 59
End: 2024-09-05
Payer: COMMERCIAL

## 2024-09-05 VITALS — BODY MASS INDEX: 31.45 KG/M2 | HEIGHT: 59 IN | WEIGHT: 156 LBS

## 2024-09-05 DIAGNOSIS — Z71.3 DIETARY COUNSELING: Primary | ICD-10-CM

## 2024-09-05 DIAGNOSIS — E66.9 CLASS 1 OBESITY WITH BODY MASS INDEX (BMI) OF 32.0 TO 32.9 IN ADULT, UNSPECIFIED OBESITY TYPE, UNSPECIFIED WHETHER SERIOUS COMORBIDITY PRESENT: ICD-10-CM

## 2024-09-05 PROCEDURE — 97802 MEDICAL NUTRITION INDIV IN: CPT | Performed by: DIETITIAN, REGISTERED

## 2024-09-05 RX ORDER — CARVEDILOL 6.25 MG/1
TABLET ORAL
Qty: 180 TABLET | Refills: 3 | Status: SHIPPED | OUTPATIENT
Start: 2024-09-05

## 2024-09-05 NOTE — PROGRESS NOTES
"Initial Nutrition Assessment    Patient was referred to nutrition by MARY Robledo  for weight management/desire to lose weight. Other PMHX significant for kidney transplant in 2022, HLD, HTN and steroid induced elevated BG levels. Still taking prednisone medication at this time. Pertinent labs reviewed which show A1c of 6.6%.     Diet recall reveals a consistent meal pattern with rare missed meals; however, reported intakes of some calorically dense foods likely contributing to lack of desired weight loss at this time. Fluids meeting recommendations in type and amount with water as primary beverage.  Pt is incorporating consistent physical activity, meeting weekly recommendations. See all interventions/recommendations below as discussed during visit this day.      Patient reported symptoms: Difficulty losing weight    Anthropometrics:  Height:   Ht Readings from Last 1 Encounters:   07/29/24 1.499 m (4' 11\")      Weight:   Wt Readings from Last 10 Encounters:   08/15/24 70.9 kg (156 lb 6.4 oz)   07/29/24 71.7 kg (158 lb)   07/16/24 72.6 kg (160 lb)   03/08/24 73 kg (161 lb)   02/29/24 73.2 kg (161 lb 4.8 oz)   01/17/24 73.5 kg (162 lb)   01/04/24 73.7 kg (162 lb 6.4 oz)   11/02/23 73.5 kg (162 lb)   10/26/23 74.3 kg (163 lb 12.8 oz)   07/12/23 71.3 kg (157 lb 3.2 oz)      Current BMI:   BMI Readings from Last 1 Encounters:   08/15/24 31.59 kg/m²        Labs:  Lab Results   Component Value Date    HGBA1C 6.6 (H) 02/29/2024      Lab Results   Component Value Date    CHOL 192 02/29/2024    CHOL 211 (H) 07/12/2023    CHOL 201 (H) 06/02/2022     Lab Results   Component Value Date    HDL 74.6 02/29/2024    HDL 77.8 07/12/2023    HDL 69.1 06/02/2022     Lab Results   Component Value Date    LDLCALC 95 02/29/2024     Lab Results   Component Value Date    TRIG 111 02/29/2024    TRIG 149 06/02/2022    TRIG 133 10/08/2020       Malnutrition Screening:  Significant Unintentional weight loss: No  Eating less than 75% " of usual intake for more than 2 weeks: No  Potential Signs of Inflammation: No    Recommended Malnutrition Diagnosis: No malnutrition identified    Diet Recall-  Breakfast- eggs and potatoes and sausage omelet OR fruit  Lunch- salads with tuna or chicken or just with fruit on top and no protein OR burger and a salad on the side   Dinner- various proteins such as chicken and a starch such as corn and a vegetable such as green beans  Daily Snacks- fruit  Beverages- sometimes coffee or tea in the morning and then water throughout the day  Alcohol- none  Physical Activity- exercise video's most days and stretching exercises and walks as often as possible    Other pertinent patient reported Information:  - Past weight loss attempts include: working with an RD  - History of kidney transplant (2022) with weight gain following such of approximately 30 lbs. Has been on prednisone following transplant continuously by report   - Feels barriers to weight loss are love of sweets and medication prednisone    Nutrition Diagnosis: Food and nutrition-related knowledge deficit related to lack of recent exposure to information as evidenced by patient has no prior knowledge of need for food and nutrition-related recommendations.    Readiness to Learn:  Cognitive ability: Alert and oriented  Motivation to learn: Interested  Family Support: Unable to assess- family not present  Instruction provided to: Patient  Patient learns best by: Multiple methods  Factors affecting learning: None   Physical limitations affecting learning: None    Education Materials Provided:   Your Mediterranean Meal Plan Booklet    Nutrition Interventions/Recommendations for 9/5/2024:  - Please refer to your book entitled: Your Mediterranean Meal Plan, and follow Mediterranean Diet eating guidelines as reviewed.  - The Healthy Plate style of eating can be a helpful tool for incorporating healthy balanced meals in appropriate portions. (Healthy Plate: Start with a  9-inch diameter plate. Fill 1/2 the plate with non-starchy vegetables, 1/4 of the plate with whole grains or starchy vegetables, and 1/4 of the plate with a lean source of protein.   - Consider pre-planning healthy meals for the week. Refer to your book for both menu and recipe ideas to get you started.  - Aim for 64 ounces of water daily.  - Aim for 150 minutes of moderate-intensity physical activity per week.  - Follow-up as scheduled for the September [facility].     Nutrition Monitoring & Evaluation: adherence to recommendations and patient stated goals    Need for follow-up:  September [facility] as scheduled    Referred by: Charlotte Bhagta, APRN-CNP     MNT Billing Type: Medical Nutrition Assessment, each 15 min increment, for 3 increments.    SIGNATURE:   Marline Canchola RD, KATHY, LD, [facility]                                                        DATE:   9/5/2024

## 2024-09-05 NOTE — PATIENT INSTRUCTIONS
- Please refer to your book entitled: Your Mediterranean Meal Plan, and follow Mediterranean Diet eating guidelines as reviewed.  - The Healthy Plate style of eating can be a helpful tool for incorporating healthy balanced meals in appropriate portions. (Healthy Plate: Start with a 9-inch diameter plate. Fill 1/2 the plate with non-starchy vegetables, 1/4 of the plate with whole grains or starchy vegetables, and 1/4 of the plate with a lean source of protein.   - Consider pre-planning healthy meals for the week. Refer to your book for both menu and recipe ideas to get you started.  - Aim for 64 ounces of water daily.  - Aim for 150 minutes of moderate-intensity physical activity per week.  - Follow-up as scheduled for the September [facility].

## 2024-09-17 ENCOUNTER — SPECIALTY PHARMACY (OUTPATIENT)
Dept: PHARMACY | Facility: CLINIC | Age: 59
End: 2024-09-17

## 2024-09-17 PROCEDURE — RXMED WILLOW AMBULATORY MEDICATION CHARGE

## 2024-09-18 ENCOUNTER — PHARMACY VISIT (OUTPATIENT)
Dept: PHARMACY | Facility: CLINIC | Age: 59
End: 2024-09-18
Payer: COMMERCIAL

## 2024-09-26 ENCOUNTER — APPOINTMENT (OUTPATIENT)
Dept: ENDOCRINOLOGY | Facility: CLINIC | Age: 59
End: 2024-09-26
Payer: COMMERCIAL

## 2024-09-26 VITALS
HEART RATE: 73 BPM | BODY MASS INDEX: 32.44 KG/M2 | SYSTOLIC BLOOD PRESSURE: 136 MMHG | WEIGHT: 160.9 LBS | TEMPERATURE: 97.1 F | DIASTOLIC BLOOD PRESSURE: 83 MMHG | HEIGHT: 59 IN

## 2024-09-26 DIAGNOSIS — I10 ESSENTIAL HYPERTENSION: ICD-10-CM

## 2024-09-26 DIAGNOSIS — E66.09 CLASS 1 OBESITY DUE TO EXCESS CALORIES WITHOUT SERIOUS COMORBIDITY WITH BODY MASS INDEX (BMI) OF 32.0 TO 32.9 IN ADULT: Primary | ICD-10-CM

## 2024-09-26 DIAGNOSIS — E11.29 CONTROLLED TYPE 2 DIABETES MELLITUS WITH OTHER DIABETIC KIDNEY COMPLICATION, WITHOUT LONG-TERM CURRENT USE OF INSULIN: ICD-10-CM

## 2024-09-26 DIAGNOSIS — E78.5 DYSLIPIDEMIA: ICD-10-CM

## 2024-09-26 DIAGNOSIS — E11.9 CONTROLLED TYPE 2 DIABETES MELLITUS WITHOUT COMPLICATION, WITHOUT LONG-TERM CURRENT USE OF INSULIN (MULTI): Primary | ICD-10-CM

## 2024-09-26 PROCEDURE — 3048F LDL-C <100 MG/DL: CPT | Performed by: NURSE PRACTITIONER

## 2024-09-26 PROCEDURE — 3044F HG A1C LEVEL LT 7.0%: CPT | Performed by: NURSE PRACTITIONER

## 2024-09-26 PROCEDURE — 99215 OFFICE O/P EST HI 40 MIN: CPT | Performed by: NURSE PRACTITIONER

## 2024-09-26 PROCEDURE — 3079F DIAST BP 80-89 MM HG: CPT | Performed by: NURSE PRACTITIONER

## 2024-09-26 PROCEDURE — 3061F NEG MICROALBUMINURIA REV: CPT | Performed by: NURSE PRACTITIONER

## 2024-09-26 PROCEDURE — 3075F SYST BP GE 130 - 139MM HG: CPT | Performed by: NURSE PRACTITIONER

## 2024-09-26 PROCEDURE — 3008F BODY MASS INDEX DOCD: CPT | Performed by: NURSE PRACTITIONER

## 2024-09-26 PROCEDURE — 1036F TOBACCO NON-USER: CPT | Performed by: NURSE PRACTITIONER

## 2024-09-26 RX ORDER — SEMAGLUTIDE 0.68 MG/ML
0.5 INJECTION, SOLUTION SUBCUTANEOUS
Qty: 3 ML | Refills: 2 | Status: SHIPPED | OUTPATIENT
Start: 2024-09-26

## 2024-09-26 NOTE — PATIENT INSTRUCTIONS
Nutrition- daily food tracking using the Lacey,     Exercise- 3 days free weight strength and 3 days cardio ,     Medication- start the Ozempic     Other- updates labs

## 2024-09-26 NOTE — PROGRESS NOTES
Subjective  Farzana De La Cruz is a 58 y.o. female with a hx of obesity and post kidney transplant who presents for weight management and obesity medicine follow up.  She has some frustration today with doing consistent exercise of strength and cardio every day but staying between 156 to 160 pounds. She notes that the transplant medications of prednisone and tacrolimus contribute to hunger and make it a challenge to lose the weight  Current Plan  1. Nutrition: Mediterranean Diet   has issues sometimes where she may skip meals due to not being hungry    2. Sleep:  up and down       3. Stress:  up and down     4. Exercise: Incorporating consistently- doing different Apps with different exercise routines at home online, does these every day       5. Appetite control:  challenging due to steroids  Obesity medication:  none     6. Prior Goals: Met  Physical fitness: continue to focus on incorporating you exercise routine into your routine and habits. [employer] videos: 20- 30 minutes (HasFit,, Burn Boot camp (modifications), free weights 12 pounds, resistance bands). Goal 150 minutes in a week or 4-5 days a week_ MET.    Nutrition: Consult with Marline, look over Mediterranean material- MET      Medications: Look over GLP1 worksheet, let me know if this is a direction you desire to take. Updates on decision- MET today      New Goals: Nutrition- daily food tracking using the Lacey, Exercise- 3 days free weight strength and 3 days cardio , and Medication- start the Ozempic Other- updates labs    Weight trend:    Wt Readings from Last 3 Encounters:   09/05/24 70.8 kg (156 lb)   08/15/24 70.9 kg (156 lb 6.4 oz)   07/29/24 71.7 kg (158 lb)       Recent weight:      Current Outpatient Medications:     acetaminophen (Tylenol) 325 mg tablet, Take by mouth every 6 hours if needed., Disp: , Rfl:     amLODIPine (Norvasc) 10 mg tablet, TAKE 1 TABLET BY MOUTH EVERY DAY AS DIRECTED, Disp: 90 tablet, Rfl: 3    aspirin 81 mg EC tablet, Take 1  tablet (81 mg) by mouth once daily., Disp: , Rfl:     carvedilol (Coreg) 6.25 mg tablet, TAKE 1 TABLET TWICE DAILY HOLD FOR SBP<110 OR HR <55, Disp: 180 tablet, Rfl: 3    cholecalciferol (Vitamin D-3) 50 mcg (2,000 unit) capsule, Take 1 capsule (2,000 Units) by mouth once daily. (Patient taking differently: Take 1 capsule (50 mcg) by mouth 2 times a day.), Disp: 90 capsule, Rfl: 3    cyanocobalamin (Vitamin B-12) 500 mcg tablet, Take by mouth., Disp: , Rfl:     magnesium oxide (Mag-Ox) 400 mg (241.3 mg magnesium) tablet, Take 1 tablet (400 mg) by mouth 2 times a day., Disp: 180 tablet, Rfl: 3    mycophenolate (Cellcept) 250 mg capsule, TAKE THREE (3) CAPSULES BY MOUTH EVERY 12 HOURS., Disp: 180 capsule, Rfl: 11    pantoprazole (ProtoNix) 40 mg EC tablet, Take 1 tablet (40 mg) by mouth once daily., Disp: , Rfl:     predniSONE (Deltasone) 5 mg tablet, TAKE ONE (1) TABLET BY MOUTH ONCE DAILY., Disp: 30 tablet, Rfl: 11    tacrolimus (Prograf) 0.5 mg capsule, Take 1 capsule (0.5 mg) by mouth once daily in the morning., Disp: 90 capsule, Rfl: 3    tacrolimus (Prograf) 1 mg capsule, Take 1 capsule (1 mg) by mouth every 12 hours., Disp: 180 capsule, Rfl: 3    ROS:  System: normal  Eyes : no visual changes  Neck : no tenderness, no new lumps/bumps  Respiratory : no SOB  Cardiovascular : no chest pain, no palpitations  Gastro-Intestinal : no abdominal concerns  Neurological : no numbness or tingling in the extremities  Musculoskeletal : no joint paint, no muscle pain  Skin : no unusual rashes  Psychiatric : no depression, no anxiety  See [facility] for Endocrine ROS    Past Medical History:   Diagnosis Date    Abnormal cytological findings in specimens from other organs, systems and tissues     Abnormal cytology    Adjustment disorder with depressed mood 12/05/2018    Grief    Allergy status to unspecified drugs, medicaments and biological substances     History of seasonal allergies    Atypical squamous cells of undetermined  significance on cytologic smear of cervix (ASC-US) 03/06/2018    ASCUS with positive high risk HPV cervical    Carrier 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 carrier of methicillin resistant Staphylococcus aureus 11/24/2021    MRSA carrier    Chronic kidney disease, stage 4 (severe) (Multi) 01/21/2022    CKD stage G4/A3, GFR 15-29 and albumin creatinine ratio >300 mg/g    Chronic kidney disease, stage 5 (Multi) 04/07/2022    CKD (chronic kidney disease) stage 5, GFR less than 15 ml/min    Cramp and spasm 10/07/2020    Nocturnal muscle cramp    Dependence on renal dialysis (CMS-[facility]) 01/21/2022    Peritoneal dialysis status    Drug induced constipation 03/09/2022    Drug-induced constipation    Encounter for general adult medical examination without abnormal findings 07/25/2017    Well adult exam    Encounter for other preprocedural examination 03/09/2022    Pre-transplant evaluation for kidney transplant    Encounter for other preprocedural examination 03/09/2022    Preop testing    Encounter for other screening for malignant neoplasm of breast 03/09/2022    Breast cancer screening    Encounter for pregnancy test, result negative 03/09/2022    Urine pregnancy test negative    End stage renal disease (Multi) 01/21/2022    ESRD on dialysis    Essential (primary) hypertension 10/26/2022    Hypertension    Other acute postprocedural pain 03/09/2022    Post-operative pain    Other disorders of phosphorus metabolism 06/28/2021    Hyperphosphatemia    Other specified counseling 12/05/2018    Grief counseling    Pain in right leg 06/25/2019    Pain of right lower extremity    Personal history of other diseases of the digestive system 01/12/2022    History of constipation    Personal history of other medical treatment 06/10/2019    History of screening mammography    Shortness of breath 04/21/2022    SOB (shortness of breath) on exertion    Unspecified astigmatism, bilateral 09/02/2022    Astigmatism of both eyes       Past Surgical History:    Procedure Laterality Date     SECTION, CLASSIC  2018     Section    MOUTH SURGERY  2018    Oral Surgery Tooth Extraction    OTHER SURGICAL HISTORY  2017    Conclusion Of Operation Implants Mesh    OTHER SURGICAL HISTORY  2022    Kidney transplantation    TUBAL LIGATION  2018    Tubal Ligation       Social History     Socioeconomic History    Marital status:      Spouse name: Not on file    Number of children: Not on file    Years of education: Not on file    Highest education level: Not on file   Occupational History    Not on file   Tobacco Use    Smoking status: Never    Smokeless tobacco: Never   Substance and Sexual Activity    Alcohol use: Never    Drug use: Never    Sexual activity: Not on file   Other Topics Concern    Not on file   Social History Narrative    Not on file     Social Determinants of Health     Financial Resource Strain: Not on file   Food Insecurity: Not on file   Transportation Needs: Not on file   Physical Activity: Not on file   Stress: Not on file   Social Connections: Not on file   Intimate Partner Violence: Not on file   Housing Stability: Not on file       Objective      Physical Exam:  There were no vitals taken for this visit.  General : alert and oriented X3, no acute distress  Eyes : EOMI     Assessment/Plan   Farzana De La Cruz is a 58 y.o. female with a hx of obesity who presents for follow up for weight management and obesity medicine visit.    A/P Follow up:    New Goals:   Nutrition- daily food tracking using the Lacey,     Exercise- 3 days free weight strength and 3 days cardio ,     Medication- start the Ozempic     Other- updates labs  [facility] Topic: Weight Plateau's with discussion including causes of, and strategies to overcome with implementation of healthy eating and exercise goals.     Dietitian Present during [facility]: Marline Canchola RD, CSOWM, LD, CDCES    Weight Management : Reviewed the principles of energy metabolism,  caloric intake and expenditure, and rationale for a treatment program.  Also reinforced need for reduced calorie, low fat diet and increased physical activity.    Follow up in a group or individual visit as determined.    Charlotte Bhagat, APRN-CNP

## 2024-10-03 RX ORDER — SEMAGLUTIDE 0.68 MG/ML
INJECTION, SOLUTION SUBCUTANEOUS
Qty: 3 ML | Refills: 2 | Status: SHIPPED | OUTPATIENT
Start: 2024-10-03

## 2024-10-10 ENCOUNTER — APPOINTMENT (OUTPATIENT)
Dept: OPHTHALMOLOGY | Facility: CLINIC | Age: 59
End: 2024-10-10
Payer: COMMERCIAL

## 2024-10-10 DIAGNOSIS — H40.053 BILATERAL OCULAR HYPERTENSION: Primary | ICD-10-CM

## 2024-10-10 PROCEDURE — 92133 CPTRZD OPH DX IMG PST SGM ON: CPT | Performed by: OPHTHALMOLOGY

## 2024-10-10 PROCEDURE — 92083 EXTENDED VISUAL FIELD XM: CPT | Performed by: OPHTHALMOLOGY

## 2024-10-10 PROCEDURE — 99214 OFFICE O/P EST MOD 30 MIN: CPT | Performed by: OPHTHALMOLOGY

## 2024-10-10 ASSESSMENT — EXTERNAL EXAM - LEFT EYE: OS_EXAM: NORMAL

## 2024-10-10 ASSESSMENT — TONOMETRY
OS_IOP_MMHG: 24
OD_IOP_MMHG: 26
IOP_METHOD: GOLDMANN APPLANATION

## 2024-10-10 ASSESSMENT — SLIT LAMP EXAM - LIDS
COMMENTS: NORMAL
COMMENTS: NORMAL

## 2024-10-10 ASSESSMENT — VISUAL ACUITY
OS_SC+: -1
OD_SC: 20/20
METHOD: SNELLEN - LINEAR
OS_SC: 20/25

## 2024-10-10 ASSESSMENT — PACHYMETRY
OS_CT(UM): 608
OD_CT(UM): 573

## 2024-10-10 ASSESSMENT — CUP TO DISC RATIO
OS_RATIO: 0.7
OD_RATIO: 0.6

## 2024-10-10 ASSESSMENT — CONF VISUAL FIELD
OD_SUPERIOR_NASAL_RESTRICTION: 0
OD_INFERIOR_TEMPORAL_RESTRICTION: 0
OS_SUPERIOR_TEMPORAL_RESTRICTION: 0
OS_NORMAL: 1
OS_INFERIOR_TEMPORAL_RESTRICTION: 0
OD_NORMAL: 1
OD_INFERIOR_NASAL_RESTRICTION: 0
OS_SUPERIOR_NASAL_RESTRICTION: 0
OD_SUPERIOR_TEMPORAL_RESTRICTION: 0
OS_INFERIOR_NASAL_RESTRICTION: 0

## 2024-10-10 ASSESSMENT — EXTERNAL EXAM - RIGHT EYE: OD_EXAM: NORMAL

## 2024-10-10 NOTE — PROGRESS NOTES
History    Chief Complaint    Glaucoma       HPI    58y F. Hx Glaucoma suspect. Pt not using any drops. Pt states va is stable. Denies pain, redness, floaters, flashes. OU itchiness.   Last edited by Caty Cunha on 10/10/2024  2:56 PM.            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 11/24/2021    MRSA carrier    Chronic kidney disease, stage 4 (severe) (Multi) 01/21/2022    CKD stage G4/A3, GFR 15-29 and albumin creatinine ratio >300 mg/g    Chronic kidney disease, stage 5 (Multi) 04/07/2022    CKD (chronic kidney disease) stage 5, GFR less than 15 ml/min    Cramp and spasm 10/07/2020    Nocturnal muscle cramp    Dependence on renal dialysis (CMS-[facility]) 01/21/2022    Peritoneal dialysis status    Drug induced constipation 03/09/2022    Drug-induced constipation    Encounter for general adult medical examination without abnormal findings 07/25/2017    Well adult exam    Encounter for other preprocedural examination 03/09/2022    Pre-transplant evaluation for kidney transplant    Encounter for other preprocedural examination 03/09/2022    Preop testing    Encounter for other screening for malignant neoplasm of breast 03/09/2022    Breast cancer screening    Encounter for pregnancy test, result negative 03/09/2022    Urine pregnancy test negative    End stage renal disease (Multi) 01/21/2022    ESRD on dialysis    Essential (primary) hypertension 10/26/2022    Hypertension    Other acute postprocedural pain  2022    Post-operative pain    Other disorders of phosphorus metabolism 2021    Hyperphosphatemia    Other specified counseling 2018    Grief counseling    Pain in right leg 2019    Pain of right lower extremity    Personal history of other diseases of the digestive system 2022    History of constipation    Personal history of other medical treatment 06/10/2019    History of screening mammography    Shortness of breath 2022    SOB (shortness of breath) on exertion    Unspecified astigmatism, bilateral 2022    Astigmatism of both eyes     Past Surgical History:   Procedure Laterality Date     SECTION, CLASSIC  2018     Section    MOUTH SURGERY  2018    Oral Surgery Tooth Extraction    OTHER SURGICAL HISTORY  2017    Conclusion Of Operation Implants Mesh    OTHER SURGICAL HISTORY  2022    Kidney transplantation    TUBAL LIGATION  2018    Tubal Ligation     SOCIAL HISTORY   SMOKING:  reports that she has never smoked. She has never used smokeless tobacco.  DRUG USE:    reports no history of drug use.    FAMILY HISTORY  family history includes Breast cancer in her maternal grandmother and mother's sister; Glaucoma in her maternal grandfather.    CURRENT MEDICATIONS  No current outpatient medications on file. (Ophthalmology pharm classes)       Current Outpatient Medications (Other)   Medication Sig Dispense Refill    acetaminophen (Tylenol) 325 mg tablet Take by mouth every 6 hours if needed.      amLODIPine (Norvasc) 10 mg tablet TAKE 1 TABLET BY MOUTH EVERY DAY AS DIRECTED 90 tablet 3    aspirin 81 mg EC tablet Take 1 tablet (81 mg) by mouth once daily.      carvedilol (Coreg) 6.25 mg tablet TAKE 1 TABLET TWICE DAILY HOLD FOR SBP<110 OR HR <55 180 tablet 3    cholecalciferol (Vitamin D-3) 50 mcg (2,000 unit) capsule Take 1 capsule (2,000 Units) by mouth once daily. (Patient taking differently: Take 1 capsule (50 mcg) by mouth 2  "times a day.) 90 capsule 3    cyanocobalamin (Vitamin B-12) 500 mcg tablet Take by mouth.      magnesium oxide (Mag-Ox) 400 mg (241.3 mg magnesium) tablet Take 1 tablet (400 mg) by mouth 2 times a day. 180 tablet 3    mycophenolate (Cellcept) 250 mg capsule TAKE THREE (3) CAPSULES BY MOUTH EVERY 12 HOURS. 180 capsule 11    pantoprazole (ProtoNix) 40 mg EC tablet Take 1 tablet (40 mg) by mouth once daily.      predniSONE (Deltasone) 5 mg tablet TAKE ONE (1) TABLET BY MOUTH ONCE DAILY. 30 tablet 11    semaglutide (Ozempic) 0.25 mg or 0.5 mg (2 mg/3 mL) pen injector INJECT 0.5MG UNDER THE SKIN EVERY 7 DAYS 3 mL 2    tacrolimus (Prograf) 0.5 mg capsule Take 1 capsule (0.5 mg) by mouth once daily in the morning. 90 capsule 3    tacrolimus (Prograf) 1 mg capsule Take 1 capsule (1 mg) by mouth every 12 hours. 180 capsule 3       Exam   Visual Acuity (Snellen - Linear)         Right Left    Dist sc 20/20 20/25 -1              Edited by: Caty Cunha              Not recorded       Not recorded       Pupils       Pupils         Pupils    Right PERRL, No APD    Left PERRL, No APD                  Intraocular pressure was 26 in the right eye and 24 in the left eye using Goldmann Applanation.  Extraocular Movement       Extraocular Movement         Right Left     Full Full                  Not recorded       Not recorded        External Exam         Right Left    External Normal Normal              Slit Lamp Exam         Right Left    Lids/Lashes Normal Normal    Conjunctiva/Sclera White and quiet White and quiet    Cornea Clear Clear    Anterior Chamber Deep and quiet Deep and quiet    Iris Round and reactive Round and reactive    Lens 1+nsc 1+nsc    Anterior Vitreous Normal Normal              Fundus Exam         Right Left    Disc Normal, even rims Normal, even rims    C/D Ratio 0.6 0.7    Macula Normal Normal    Vessels Normal Normal    Periphery Normal Normal                   <div id=\"MAIN_EXAM_REVIEWED\"></div> "       Diagnostics  Martino Visual Field - OU - Both Eyes          NSD ou         OCT, Optic Nerve - OU - Both Eyes          ROBUST OU, stable to baseline [place]             Plan   -  The encounter diagnosis was Primary open angle glaucoma of both eyes, unspecified glaucoma stage.  -  Referred By:  Stella Koenig MD  -  IOP:  Last Tonometry OD 26 / OS 24 /Date 3:01 PM      Target OD: No Value exists for the : EPIC#CEH524 Target OS: No Value exists for the : EPIC#GTC376       Max pressure OD:   Date:         Max Pressure OS:   Date:    -    Not recorded         -    Not recorded       -  Pathophysiology of glaucoma, and potential blinding nature of disease reviewed.      Importance of follow up and compliance stressed  Extensive family hx on mothers side of glaucoma  Otherwise ROS negative   IOP is up further, nerves remain healthy with stable testing  Will follow more closely     -  RTC 6 months for IOP check and repeat gonio

## 2024-10-16 PROCEDURE — RXMED WILLOW AMBULATORY MEDICATION CHARGE

## 2024-10-17 ENCOUNTER — HOSPITAL ENCOUNTER (OUTPATIENT)
Dept: RADIOLOGY | Facility: CLINIC | Age: 59
Discharge: HOME | End: 2024-10-17
Payer: COMMERCIAL

## 2024-10-17 DIAGNOSIS — M81.8 OTHER OSTEOPOROSIS, UNSPECIFIED PATHOLOGICAL FRACTURE PRESENCE: ICD-10-CM

## 2024-10-17 PROCEDURE — 77080 DXA BONE DENSITY AXIAL: CPT

## 2024-10-17 PROCEDURE — 77080 DXA BONE DENSITY AXIAL: CPT | Performed by: RADIOLOGY

## 2024-10-18 ENCOUNTER — PHARMACY VISIT (OUTPATIENT)
Dept: PHARMACY | Facility: CLINIC | Age: 59
End: 2024-10-18
Payer: COMMERCIAL

## 2024-10-24 ENCOUNTER — APPOINTMENT (OUTPATIENT)
Dept: ENDOCRINOLOGY | Facility: CLINIC | Age: 59
End: 2024-10-24
Payer: COMMERCIAL

## 2024-11-15 ENCOUNTER — SPECIALTY PHARMACY (OUTPATIENT)
Dept: PHARMACY | Facility: CLINIC | Age: 59
End: 2024-11-15

## 2024-11-15 PROCEDURE — RXMED WILLOW AMBULATORY MEDICATION CHARGE

## 2024-11-20 ENCOUNTER — PHARMACY VISIT (OUTPATIENT)
Dept: PHARMACY | Facility: CLINIC | Age: 59
End: 2024-11-20
Payer: COMMERCIAL

## 2024-11-20 ENCOUNTER — LAB (OUTPATIENT)
Dept: LAB | Facility: LAB | Age: 59
End: 2024-11-20
Payer: COMMERCIAL

## 2024-11-20 DIAGNOSIS — E55.9 VITAMIN D DEFICIENCY: ICD-10-CM

## 2024-11-20 DIAGNOSIS — Z94.0 KIDNEY REPLACED BY TRANSPLANT (HHS-HCC): ICD-10-CM

## 2024-11-20 LAB
25(OH)D3 SERPL-MCNC: 37 NG/ML (ref 30–100)
ALBUMIN SERPL BCP-MCNC: 4.3 G/DL (ref 3.4–5)
ANION GAP SERPL CALC-SCNC: 14 MMOL/L (ref 10–20)
BUN SERPL-MCNC: 9 MG/DL (ref 6–23)
CALCIUM SERPL-MCNC: 10.3 MG/DL (ref 8.6–10.6)
CHLORIDE SERPL-SCNC: 104 MMOL/L (ref 98–107)
CO2 SERPL-SCNC: 28 MMOL/L (ref 21–32)
CREAT SERPL-MCNC: 0.72 MG/DL (ref 0.5–1.05)
CREAT UR-MCNC: 80 MG/DL (ref 20–320)
EGFRCR SERPLBLD CKD-EPI 2021: >90 ML/MIN/1.73M*2
ERYTHROCYTE [DISTWIDTH] IN BLOOD BY AUTOMATED COUNT: 14 % (ref 11.5–14.5)
GLUCOSE SERPL-MCNC: 115 MG/DL (ref 74–99)
HCT VFR BLD AUTO: 41 % (ref 36–46)
HGB BLD-MCNC: 12.7 G/DL (ref 12–16)
MAGNESIUM SERPL-MCNC: 1.84 MG/DL (ref 1.6–2.4)
MCH RBC QN AUTO: 27 PG (ref 26–34)
MCHC RBC AUTO-ENTMCNC: 31 G/DL (ref 32–36)
MCV RBC AUTO: 87 FL (ref 80–100)
NRBC BLD-RTO: 0 /100 WBCS (ref 0–0)
PHOSPHATE SERPL-MCNC: 3.2 MG/DL (ref 2.5–4.9)
PLATELET # BLD AUTO: 513 X10*3/UL (ref 150–450)
POTASSIUM SERPL-SCNC: 4.5 MMOL/L (ref 3.5–5.3)
PROT UR-ACNC: 6 MG/DL (ref 5–24)
PROT/CREAT UR: 0.08 MG/MG CREAT (ref 0–0.17)
PTH-INTACT SERPL-MCNC: 122.2 PG/ML (ref 18.5–88)
RBC # BLD AUTO: 4.7 X10*6/UL (ref 4–5.2)
SODIUM SERPL-SCNC: 141 MMOL/L (ref 136–145)
TACROLIMUS BLD-MCNC: 6.7 NG/ML
WBC # BLD AUTO: 7.6 X10*3/UL (ref 4.4–11.3)

## 2024-11-20 PROCEDURE — 80069 RENAL FUNCTION PANEL: CPT

## 2024-11-20 PROCEDURE — 36415 COLL VENOUS BLD VENIPUNCTURE: CPT

## 2024-11-20 PROCEDURE — 87799 DETECT AGENT NOS DNA QUANT: CPT

## 2024-11-20 PROCEDURE — 85027 COMPLETE CBC AUTOMATED: CPT

## 2024-11-20 PROCEDURE — 84156 ASSAY OF PROTEIN URINE: CPT

## 2024-11-20 PROCEDURE — 82306 VITAMIN D 25 HYDROXY: CPT

## 2024-11-20 PROCEDURE — 83735 ASSAY OF MAGNESIUM: CPT

## 2024-11-20 PROCEDURE — 80197 ASSAY OF TACROLIMUS: CPT

## 2024-11-20 PROCEDURE — 83970 ASSAY OF PARATHORMONE: CPT

## 2024-11-20 PROCEDURE — 82570 ASSAY OF URINE CREATININE: CPT

## 2024-11-21 LAB
BKV DNA SERPL NAA+PROBE-LOG#: NORMAL {LOG_COPIES}/ML
LABORATORY COMMENT REPORT: NOT DETECTED

## 2024-12-02 ENCOUNTER — HOSPITAL ENCOUNTER (OUTPATIENT)
Dept: RADIOLOGY | Facility: CLINIC | Age: 59
Discharge: HOME | End: 2024-12-02
Payer: COMMERCIAL

## 2024-12-02 DIAGNOSIS — M25.551 PAIN OF RIGHT HIP: ICD-10-CM

## 2024-12-02 PROCEDURE — 73502 X-RAY EXAM HIP UNI 2-3 VIEWS: CPT | Mod: RIGHT SIDE | Performed by: RADIOLOGY

## 2024-12-02 PROCEDURE — 73502 X-RAY EXAM HIP UNI 2-3 VIEWS: CPT | Mod: RT

## 2024-12-05 ENCOUNTER — APPOINTMENT (OUTPATIENT)
Dept: DERMATOLOGY | Facility: CLINIC | Age: 59
End: 2024-12-05
Payer: COMMERCIAL

## 2024-12-09 ENCOUNTER — LAB (OUTPATIENT)
Dept: LAB | Facility: LAB | Age: 59
End: 2024-12-09
Payer: COMMERCIAL

## 2024-12-09 ENCOUNTER — HOSPITAL ENCOUNTER (OUTPATIENT)
Dept: RADIOLOGY | Facility: CLINIC | Age: 59
Discharge: HOME | End: 2024-12-09
Payer: COMMERCIAL

## 2024-12-09 ENCOUNTER — APPOINTMENT (OUTPATIENT)
Dept: PRIMARY CARE | Facility: CLINIC | Age: 59
End: 2024-12-09
Payer: COMMERCIAL

## 2024-12-09 VITALS
HEART RATE: 87 BPM | DIASTOLIC BLOOD PRESSURE: 86 MMHG | OXYGEN SATURATION: 98 % | WEIGHT: 160 LBS | BODY MASS INDEX: 32.25 KG/M2 | TEMPERATURE: 97 F | HEIGHT: 59 IN | RESPIRATION RATE: 14 BRPM | SYSTOLIC BLOOD PRESSURE: 138 MMHG

## 2024-12-09 DIAGNOSIS — E09.9: ICD-10-CM

## 2024-12-09 DIAGNOSIS — M70.61 GREATER TROCHANTERIC BURSITIS OF RIGHT HIP: ICD-10-CM

## 2024-12-09 DIAGNOSIS — M54.50 ACUTE RIGHT-SIDED LOW BACK PAIN, UNSPECIFIED WHETHER SCIATICA PRESENT: ICD-10-CM

## 2024-12-09 DIAGNOSIS — M25.551 PAIN OF RIGHT HIP: ICD-10-CM

## 2024-12-09 DIAGNOSIS — M54.50 ACUTE RIGHT-SIDED LOW BACK PAIN, UNSPECIFIED WHETHER SCIATICA PRESENT: Primary | ICD-10-CM

## 2024-12-09 LAB
CHOLEST SERPL-MCNC: 203 MG/DL (ref 0–199)
CHOLESTEROL/HDL RATIO: 2.7
EST. AVERAGE GLUCOSE BLD GHB EST-MCNC: 134 MG/DL
HBA1C MFR BLD: 6.3 %
HDLC SERPL-MCNC: 74.8 MG/DL
LDLC SERPL DIRECT ASSAY-MCNC: 90 MG/DL (ref 0–129)
NON-HDL CHOLESTEROL: 128 MG/DL (ref 0–149)

## 2024-12-09 PROCEDURE — 83721 ASSAY OF BLOOD LIPOPROTEIN: CPT

## 2024-12-09 PROCEDURE — 99214 OFFICE O/P EST MOD 30 MIN: CPT | Performed by: FAMILY MEDICINE

## 2024-12-09 PROCEDURE — 82465 ASSAY BLD/SERUM CHOLESTEROL: CPT

## 2024-12-09 PROCEDURE — 1036F TOBACCO NON-USER: CPT | Performed by: FAMILY MEDICINE

## 2024-12-09 PROCEDURE — 3079F DIAST BP 80-89 MM HG: CPT | Performed by: FAMILY MEDICINE

## 2024-12-09 PROCEDURE — 3044F HG A1C LEVEL LT 7.0%: CPT | Performed by: FAMILY MEDICINE

## 2024-12-09 PROCEDURE — 3008F BODY MASS INDEX DOCD: CPT | Performed by: FAMILY MEDICINE

## 2024-12-09 PROCEDURE — 83036 HEMOGLOBIN GLYCOSYLATED A1C: CPT

## 2024-12-09 PROCEDURE — 36415 COLL VENOUS BLD VENIPUNCTURE: CPT

## 2024-12-09 PROCEDURE — 3048F LDL-C <100 MG/DL: CPT | Performed by: FAMILY MEDICINE

## 2024-12-09 PROCEDURE — 83718 ASSAY OF LIPOPROTEIN: CPT

## 2024-12-09 PROCEDURE — 3075F SYST BP GE 130 - 139MM HG: CPT | Performed by: FAMILY MEDICINE

## 2024-12-09 PROCEDURE — 3061F NEG MICROALBUMINURIA REV: CPT | Performed by: FAMILY MEDICINE

## 2024-12-09 ASSESSMENT — PATIENT HEALTH QUESTIONNAIRE - PHQ9
1. LITTLE INTEREST OR PLEASURE IN DOING THINGS: NOT AT ALL
2. FEELING DOWN, DEPRESSED OR HOPELESS: NOT AT ALL
SUM OF ALL RESPONSES TO PHQ9 QUESTIONS 1 AND 2: 0

## 2024-12-09 NOTE — PATIENT INSTRUCTIONS
Hip and leg pain  - PT  - xray  - TENS unit OTC  - Tylenol 500- 1000mg every 6hyrs as needed  -Topical creams- Voltaren gel, Salonpas, Icy hot, Bio freeze, Capsaicin, Asper cream, or Tiger balm (these are all over the counter)      DM due to steroid use  - will check hba1c- if still high will add GLP1( will send to  pharm)  - consider adding statin        Please follow up in as scheduled in JAN or as needed.       ** If labs or imaging ordered at today's visit, all the non-urgent results will be discussed at your next visit    If you have been referred for a special test or to a specialist please call  [phone] to schedule an appointment.  If you have any further questions, or if develop new or worsened symptoms, please give our office a call at [phone].

## 2024-12-09 NOTE — PROGRESS NOTES
"Subjective   Patient ID: Farzana De La Cruz is a 59 y.o. female who presents for Follow-up (Pt is here following up for right hip pain.).    HPI     Right hip/ leg and back pain- started a couple of weeks ago. No trauma to the area  Prolonged standing and walking causing pain    New onset Dm due to prolonged steroid use( from kidney transplant).  Hba1c 6.6- rechecking today  Ozempic was sent over to pharm from wt loss team0 but was not covered. Would like ot try another glp1 for further help  Has been with wt gain from steroid use as well      Review of Systems  All systems reviewed and neg if not noted in the HPI above     Objective   /86 (Patient Position: Sitting)   Pulse 87   Temp 36.1 °C (97 °F)   Resp 14   Ht 1.499 m (4' 11\")   Wt 72.6 kg (160 lb)   SpO2 98%   BMI 32.32 kg/m²     Physical Exam  Constitutional:       Appearance: Normal appearance.   HENT:      Head: Normocephalic.   Eyes:      Extraocular Movements: Extraocular movements intact.      Conjunctiva/sclera: Conjunctivae normal.      Pupils: Pupils are equal, round, and reactive to light.   Pulmonary:      Effort: Pulmonary effort is normal.   Musculoskeletal:         General: No deformity or signs of injury.      Right hip: Bony tenderness present. Decreased range of motion.      Comments: TTP at the GTB  + pain with abduction and adduction in the right hip    Patient appears to be in mild to moderate pain, + antalgic gait noted.   No masses.  Painful and reduced LS ROM noted.   Straight leg raise is neg       Skin:     Coloration: Skin is not jaundiced.      Findings: No rash.   Neurological:      Mental Status: She is alert and oriented to person, place, and time. Mental status is at baseline.   Psychiatric:         Mood and Affect: Mood normal.         Assessment/Plan   Problem List Items Addressed This Visit    None  Visit Diagnoses         Codes    Acute right-sided low back pain, unspecified whether sciatica present    -  Primary " M54.50    Relevant Orders    XR hip right with pelvis when performed 2 or 3 views    Referral to Physical Therapy    Pain of right hip     M25.551    Relevant Orders    XR hip right with pelvis when performed 2 or 3 views    Referral to Physical Therapy    Greater trochanteric bursitis of right hip     M70.61    Relevant Orders    XR hip right with pelvis when performed 2 or 3 views    Referral to Physical Therapy    Diabetes mellitus, drug-related (Multi)     E09.9    Relevant Orders    Lipid Panel Non-Fasting    Cholesterol, LDL Direct    Hemoglobin A1C              Please follow up in as scheduled in JAN or as needed.

## 2024-12-11 ENCOUNTER — TELEPHONE (OUTPATIENT)
Dept: TRANSPLANT | Facility: HOSPITAL | Age: 59
End: 2024-12-11

## 2024-12-11 NOTE — TELEPHONE ENCOUNTER
PATIENT LETTER WAS MARKED AS RETURN TO SENDER   CALLED PATIENT TO INFORM ABOUT [facility] DEPT MOVE   RECEIVED UPDATED ADDRESS FROM PATIENT

## 2024-12-16 ENCOUNTER — SPECIALTY PHARMACY (OUTPATIENT)
Dept: PHARMACY | Facility: CLINIC | Age: 59
End: 2024-12-16

## 2024-12-16 PROCEDURE — RXMED WILLOW AMBULATORY MEDICATION CHARGE

## 2024-12-17 ENCOUNTER — PHARMACY VISIT (OUTPATIENT)
Dept: PHARMACY | Facility: CLINIC | Age: 59
End: 2024-12-17
Payer: COMMERCIAL

## 2025-01-16 ENCOUNTER — SPECIALTY PHARMACY (OUTPATIENT)
Dept: PHARMACY | Facility: CLINIC | Age: 60
End: 2025-01-16

## 2025-01-16 PROCEDURE — RXMED WILLOW AMBULATORY MEDICATION CHARGE

## 2025-01-17 ENCOUNTER — PHARMACY VISIT (OUTPATIENT)
Dept: PHARMACY | Facility: CLINIC | Age: 60
End: 2025-01-17
Payer: COMMERCIAL

## 2025-01-23 ENCOUNTER — APPOINTMENT (OUTPATIENT)
Dept: PRIMARY CARE | Facility: CLINIC | Age: 60
End: 2025-01-23
Payer: COMMERCIAL

## 2025-01-23 VITALS
TEMPERATURE: 98 F | OXYGEN SATURATION: 100 % | WEIGHT: 160.9 LBS | SYSTOLIC BLOOD PRESSURE: 106 MMHG | RESPIRATION RATE: 16 BRPM | DIASTOLIC BLOOD PRESSURE: 66 MMHG | HEART RATE: 77 BPM | BODY MASS INDEX: 32.5 KG/M2

## 2025-01-23 DIAGNOSIS — Z00.00 WELLNESS EXAMINATION: Primary | ICD-10-CM

## 2025-01-23 DIAGNOSIS — D84.821 IMMUNODEFICIENCY DUE TO DRUGS (CODE): ICD-10-CM

## 2025-01-23 DIAGNOSIS — N25.81 SECONDARY HYPERPARATHYROIDISM, RENAL (MULTI): ICD-10-CM

## 2025-01-23 DIAGNOSIS — Z12.31 ENCOUNTER FOR SCREENING MAMMOGRAM FOR BREAST CANCER: ICD-10-CM

## 2025-01-23 PROBLEM — E11.29 CONTROLLED TYPE 2 DIABETES MELLITUS WITH OTHER DIABETIC KIDNEY COMPLICATION, WITHOUT LONG-TERM CURRENT USE OF INSULIN: Status: RESOLVED | Noted: 2025-01-23 | Resolved: 2025-01-23

## 2025-01-23 PROBLEM — E11.29 CONTROLLED TYPE 2 DIABETES MELLITUS WITH OTHER DIABETIC KIDNEY COMPLICATION, WITHOUT LONG-TERM CURRENT USE OF INSULIN: Status: ACTIVE | Noted: 2025-01-23

## 2025-01-23 PROCEDURE — 99396 PREV VISIT EST AGE 40-64: CPT | Performed by: FAMILY MEDICINE

## 2025-01-23 PROCEDURE — 3074F SYST BP LT 130 MM HG: CPT | Performed by: FAMILY MEDICINE

## 2025-01-23 PROCEDURE — 3078F DIAST BP <80 MM HG: CPT | Performed by: FAMILY MEDICINE

## 2025-01-23 PROCEDURE — 1036F TOBACCO NON-USER: CPT | Performed by: FAMILY MEDICINE

## 2025-01-23 NOTE — PROGRESS NOTES
Subjective   Patient ID: Farzana De La Cruz is a 59 y.o. female who presents for Annual Exam.    HPI       Here for a physical  Does not want a chaperone.     Health Maintenance:  -   Colonoscopy: 11/14/17- repeat in 10 yrs  -   Mammogram: due in 3/25- ordered  -   PAP: due 11/24- to call for appt  -   Bone density DEXA: 10/17/24- repeat in 2 yrs  -   Hep C screening- completed  Immunizations:  - COVID, Rsv      Other concerns/issues/followup:  1 -was with DM  due to prolonged steroid use( from kidney transplant).  Hba1c 6.3<--6.6  Ozempic was sent over to pharm from wt loss team0 but was not covered.  Has been with wt gain from steroid use as well  Would like to work on diet and exericse prior to starting glp1 since doing so well    2 -  HTN  BP at goal today in office.  Using medications without issues.  Denies CP, SOB, palpitations, change in vision, dizziness, N/V.    PMSFH was reviewed & updated.     ---Social---  Job: [employer] working from home  :   Kids: 3  Likes/hobbies:  reading and movies        ETOH - none  Drugs - none  Tobacco - none     Review of Systems  All systems reviewed and neg if not noted in the HPI above     Objective   /66   Pulse 77   Temp 36.7 °C (98 °F)   Resp 16   Wt 73 kg (160 lb 14.4 oz)   SpO2 100%   BMI 32.50 kg/m²     Physical Exam  Vitals reviewed.   Constitutional:       Appearance: Normal appearance.   HENT:      Head: Normocephalic.   Eyes:      Extraocular Movements: Extraocular movements intact.   Cardiovascular:      Rate and Rhythm: Normal rate and regular rhythm.      Heart sounds: Normal heart sounds. No murmur heard.  Pulmonary:      Effort: Pulmonary effort is normal. No respiratory distress.      Breath sounds: Normal breath sounds. No wheezing.   Abdominal:      General: Bowel sounds are normal. There is no distension.      Palpations: Abdomen is soft.   Skin:     General: Skin is warm and dry.   Neurological:      General: No focal deficit  present.      Mental Status: She is alert and oriented to person, place, and time.   Psychiatric:         Mood and Affect: Mood normal.         Behavior: Behavior normal.         Thought Content: Thought content normal.         Judgment: Judgment normal.       Assessment/Plan   Problem List Items Addressed This Visit             ICD-10-CM    Immunosuppression D84.9     Stable  Continue with follow up with transplant and renal team         Secondary hyperparathyroidism, renal (Multi) N25.81     PTH - 122.2 <--- 159.3  Continue with follow up wt endo and renal team          Other Visit Diagnoses         Codes    Wellness examination    -  Primary Z00.00    Encounter for screening mammogram for breast cancer     Z12.31    Relevant Orders    BI mammo bilateral screening tomosynthesis                  Please follow up in   - 6 months for HTN   - or as needed.

## 2025-01-23 NOTE — PATIENT INSTRUCTIONS
Health Maintenance:  -   Colonoscopy: 11/14/17- repeat in 10 yrs  -   Mammogram: due in 3/25- ordered- call for appt  -   PAP: due 11/24- to call for appt  -   Bone density DEXA: 10/17/24- repeat in 2 yrs  -   Hep C screening- completed  Immunizations:  - COVID, rsv- can get from local pharm    HTN  - Your blood pressure is at goal today- goal is less than 130/80  - Continue your current medications  - Weight loss can help lower your bp!  Work on a healthy whole food diet and add at least 30min of exercise 5 days per week  - Work on a low salt diet    Continue to work on healthy diet and exercise  - considering a walking pad      Please follow up in   - 6 months for HTN   - or as needed.       ** If labs or imaging ordered at today's visit, all the non-urgent results will be discussed at your next visit    If you have been referred for a special test or to a specialist please call  [phone] to schedule an appointment.  If you have any further questions, or if develop new or worsened symptoms, please give our office a call at [phone].

## 2025-01-24 ENCOUNTER — APPOINTMENT (OUTPATIENT)
Dept: PHYSICAL THERAPY | Facility: CLINIC | Age: 60
End: 2025-01-24
Payer: COMMERCIAL

## 2025-02-13 ENCOUNTER — SPECIALTY PHARMACY (OUTPATIENT)
Dept: PHARMACY | Facility: CLINIC | Age: 60
End: 2025-02-13

## 2025-02-13 PROCEDURE — RXMED WILLOW AMBULATORY MEDICATION CHARGE

## 2025-02-14 ENCOUNTER — PHARMACY VISIT (OUTPATIENT)
Dept: PHARMACY | Facility: CLINIC | Age: 60
End: 2025-02-14
Payer: COMMERCIAL

## 2025-02-19 ENCOUNTER — LAB (OUTPATIENT)
Dept: LAB | Facility: HOSPITAL | Age: 60
End: 2025-02-19
Payer: COMMERCIAL

## 2025-02-19 DIAGNOSIS — Z94.0 KIDNEY TRANSPLANT STATUS: Primary | ICD-10-CM

## 2025-02-19 DIAGNOSIS — Z94.0 KIDNEY REPLACED BY TRANSPLANT (HHS-HCC): ICD-10-CM

## 2025-02-19 LAB
ALBUMIN SERPL BCP-MCNC: 4.3 G/DL (ref 3.4–5)
ANION GAP SERPL CALC-SCNC: 12 MMOL/L (ref 10–20)
BUN SERPL-MCNC: 10 MG/DL (ref 6–23)
CALCIUM SERPL-MCNC: 9.6 MG/DL (ref 8.6–10.6)
CHLORIDE SERPL-SCNC: 104 MMOL/L (ref 98–107)
CO2 SERPL-SCNC: 28 MMOL/L (ref 21–32)
CREAT SERPL-MCNC: 0.71 MG/DL (ref 0.5–1.05)
CREAT UR-MCNC: 44.1 MG/DL (ref 20–320)
EGFRCR SERPLBLD CKD-EPI 2021: >90 ML/MIN/1.73M*2
ERYTHROCYTE [DISTWIDTH] IN BLOOD BY AUTOMATED COUNT: 14.8 % (ref 11.5–14.5)
GLUCOSE SERPL-MCNC: 100 MG/DL (ref 74–99)
HCT VFR BLD AUTO: 40.4 % (ref 36–46)
HGB BLD-MCNC: 12.1 G/DL (ref 12–16)
MAGNESIUM SERPL-MCNC: 1.79 MG/DL (ref 1.6–2.4)
MCH RBC QN AUTO: 26.8 PG (ref 26–34)
MCHC RBC AUTO-ENTMCNC: 30 G/DL (ref 32–36)
MCV RBC AUTO: 90 FL (ref 80–100)
NRBC BLD-RTO: 0 /100 WBCS (ref 0–0)
PHOSPHATE SERPL-MCNC: 3.9 MG/DL (ref 2.5–4.9)
PLATELET # BLD AUTO: 458 X10*3/UL (ref 150–450)
POTASSIUM SERPL-SCNC: 4 MMOL/L (ref 3.5–5.3)
PROT UR-ACNC: <4 MG/DL (ref 5–24)
PROT/CREAT UR: ABNORMAL MG/G{CREAT}
RBC # BLD AUTO: 4.51 X10*6/UL (ref 4–5.2)
SODIUM SERPL-SCNC: 140 MMOL/L (ref 136–145)
TACROLIMUS BLD-MCNC: 7 NG/ML
WBC # BLD AUTO: 8.6 X10*3/UL (ref 4.4–11.3)

## 2025-02-19 PROCEDURE — 80197 ASSAY OF TACROLIMUS: CPT

## 2025-02-19 PROCEDURE — 84156 ASSAY OF PROTEIN URINE: CPT

## 2025-02-19 PROCEDURE — 36415 COLL VENOUS BLD VENIPUNCTURE: CPT

## 2025-02-19 PROCEDURE — 85027 COMPLETE CBC AUTOMATED: CPT

## 2025-02-19 PROCEDURE — 80069 RENAL FUNCTION PANEL: CPT

## 2025-02-19 PROCEDURE — 82570 ASSAY OF URINE CREATININE: CPT

## 2025-02-19 PROCEDURE — 83735 ASSAY OF MAGNESIUM: CPT

## 2025-02-20 ENCOUNTER — OFFICE VISIT (OUTPATIENT)
Facility: HOSPITAL | Age: 60
End: 2025-02-20
Payer: COMMERCIAL

## 2025-02-20 VITALS
SYSTOLIC BLOOD PRESSURE: 143 MMHG | DIASTOLIC BLOOD PRESSURE: 92 MMHG | HEART RATE: 88 BPM | WEIGHT: 162.8 LBS | TEMPERATURE: 96.6 F | OXYGEN SATURATION: 96 % | BODY MASS INDEX: 32.88 KG/M2

## 2025-02-20 DIAGNOSIS — I10 ESSENTIAL HYPERTENSION: ICD-10-CM

## 2025-02-20 DIAGNOSIS — D84.9 IMMUNOSUPPRESSION: ICD-10-CM

## 2025-02-20 DIAGNOSIS — Z94.0 KIDNEY REPLACED BY TRANSPLANT (HHS-HCC): Primary | ICD-10-CM

## 2025-02-20 DIAGNOSIS — M85.80 OSTEOPENIA, UNSPECIFIED LOCATION: ICD-10-CM

## 2025-02-20 PROCEDURE — 3077F SYST BP >= 140 MM HG: CPT | Performed by: INTERNAL MEDICINE

## 2025-02-20 PROCEDURE — 3080F DIAST BP >= 90 MM HG: CPT | Performed by: INTERNAL MEDICINE

## 2025-02-20 PROCEDURE — 99214 OFFICE O/P EST MOD 30 MIN: CPT | Performed by: INTERNAL MEDICINE

## 2025-02-20 RX ORDER — PREDNISONE 5 MG/1
5 TABLET ORAL EVERY OTHER DAY
Qty: 45 TABLET | Refills: 3 | Status: SHIPPED | OUTPATIENT
Start: 2025-02-20 | End: 2026-02-20

## 2025-02-20 ASSESSMENT — PAIN SCALES - GENERAL: PAINLEVEL_OUTOF10: 0-NO PAIN

## 2025-02-20 NOTE — PROGRESS NOTES
TRANSPLANT NEPHROLOGY :   OUTPATIENT CLINIC NOTE      SERVICE DATE : 02/20/2025    REASON FOR VISIT/CHIEF COMPLAINT:  S/P  TRANSPLANT SURGERY  IMMUNOSUPPRESSIVE MEDICATION MANAGEMENT  BLOOD PRESSURE MANAGEMENT    HPI:    Ms. De La Cruz is a 58 y.o. female with past medical history significant for ESRD secondary to hypertensive nephrosclerosis s/p living donor kidney transplant on 3/8/2022. Patient's and donor EBV status is D negative/R positive and CMV status D-/R+. Patient was induced by [employer] and maintained on triple immunosuppression. Patient have spontaneous kidney function and did not require any dialysis post transplant her PD catheter removed along with the stent removal on 3/24/2022.       Patient is here for follow up s/p kidney transplant.     Patient is doing well overall. No new complaints. Denied chest pain, SOB, SELBY, Palpitation. Stable weight, trying to exercise every day. Her A1c has decreased to 6.2 with lifestyle changes.   BP at home well controlled, checks often. Taking her medications as prescribed. Normal urination and bowel movement. No recent infection, hospitalization, surgery or ER visits.        ROS:  Review of  14 systems was performed system by system. See HPI. Otherwise, the symptoms were negative.       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 11/24/2021    MRSA carrier    Chronic kidney disease, stage 4 (severe) (Multi) 01/21/2022    CKD stage G4/A3, GFR  15-29 and albumin creatinine ratio >300 mg/g    Chronic kidney disease, stage 5 (Multi) 2022    CKD (chronic kidney disease) stage 5, GFR less than 15 ml/min    Cramp and spasm 10/07/2020    Nocturnal muscle cramp    Dependence on renal dialysis ([facility]) 2022    Peritoneal dialysis status    Drug induced constipation 2022    Drug-induced constipation    Encounter for general adult medical examination without abnormal findings 2017    Well adult exam    Encounter for other preprocedural examination 2022    Pre-transplant evaluation for kidney transplant    Encounter for other preprocedural examination 2022    Preop testing    Encounter for other screening for malignant neoplasm of breast 2022    Breast cancer screening    Encounter for pregnancy test, result negative 2022    Urine pregnancy test negative    End stage renal disease (Multi) 2022    ESRD on dialysis    Essential (primary) hypertension 10/26/2022    Hypertension    Other acute postprocedural pain 2022    Post-operative pain    Other disorders of phosphorus metabolism 2021    Hyperphosphatemia    Other specified counseling 2018    Grief counseling    Pain in right leg 2019    Pain of right lower extremity    Personal history of other diseases of the digestive system 2022    History of constipation    Personal history of other medical treatment 06/10/2019    History of screening mammography    Shortness of breath 2022    SOB (shortness of breath) on exertion    Unspecified astigmatism, bilateral 2022    Astigmatism of both eyes        PAST SURGICAL HISTORY:  Past Surgical History:   Procedure Laterality Date     SECTION, CLASSIC  2018     Section    MOUTH SURGERY  2018    Oral Surgery Tooth Extraction    OTHER SURGICAL HISTORY  2017    Conclusion Of Operation Implants Mesh    OTHER SURGICAL HISTORY  2022    Kidney  transplantation    TUBAL LIGATION  02/02/2018    Tubal Ligation        SOCIAL HISTORY:  Social History     Socioeconomic History    Marital status:      Spouse name: Not on file    Number of children: Not on file    Years of education: Not on file    Highest education level: Not on file   Occupational History    Not on file   Tobacco Use    Smoking status: Never    Smokeless tobacco: Never   Substance and Sexual Activity    Alcohol use: Never    Drug use: Never    Sexual activity: Not on file   Other Topics Concern    Not on file   Social History Narrative    Not on file     Social Drivers of Health     Financial Resource Strain: Not on file   Food Insecurity: Not on file   Transportation Needs: Not on file   Physical Activity: Not on file   Stress: Not on file   Social Connections: Not on file   Intimate Partner Violence: Not on file   Housing Stability: Not on file       FAMILY HISTORY:  Family History   Problem Relation Name Age of Onset    Breast cancer Maternal Grandmother      Glaucoma Maternal Grandfather      Breast cancer Mother's Sister         MEDICATION LIST:  Current Outpatient Medications   Medication Instructions    acetaminophen (Tylenol) 325 mg tablet oral, Every 6 hours PRN    amLODIPine (NORVASC) 10 mg, oral, Daily, as directed    aspirin 81 mg EC tablet 1 tablet, Daily    carvedilol (Coreg) 6.25 mg tablet TAKE 1 TABLET TWICE DAILY HOLD FOR SBP<110 OR HR <55    cyanocobalamin (Vitamin B-12) 500 mcg tablet Take by mouth.    magnesium oxide (MAG-OX) 400 mg, oral, 2 times daily    mycophenolate (Cellcept) 250 mg capsule TAKE THREE (3) CAPSULES BY MOUTH EVERY 12 HOURS.    pantoprazole (ProtoNix) 40 mg EC tablet 1 tablet, Daily    predniSONE (Deltasone) 5 mg tablet TAKE ONE (1) TABLET BY MOUTH ONCE DAILY.    tacrolimus (PROGRAF) 1 mg, oral, Every 12 hours    tacrolimus (PROGRAF) 0.5 mg, oral, Every morning       ALLERGY  Allergies   Allergen Reactions    Egg Unknown    Latex Itching, Rash and  Unknown    Senna Other     cramps       PHYSICAL EXAM:    Visit Vitals  BP (!) 143/92   Pulse 88   Temp 35.9 °C (96.6 °F) (Temporal)   Wt 73.8 kg (162 lb 12.8 oz)   SpO2 96%   BMI 32.88 kg/m²   OB Status Postmenopausal   Smoking Status Never   BSA 1.75 m²          Vital signs - reviewed. Acceptable BP at this office visit.   General Appearance - NAD  HEENT - Supple. Not pale.   CVS - RRR. Normal S1/S2. No murmur, click , rub or gallop  Lungs- clear to auscultation bilaterally  Abdomen - soft , not tender, no guarding, no rigidity.  No masses and ascites. S/P Kidney transplant .  Transplanted kidney is not tender.   Musculoskeletal /Extremities - no edema.   Neuro/Psych - appropriate mood and affect.   Skin - No visible rash      LABS:    Lab Results   Component Value Date    WBC 8.6 02/19/2025    HGB 12.1 02/19/2025    HCT 40.4 02/19/2025     (H) 02/19/2025    CHOL 203 (H) 12/09/2024    TRIG 111 02/29/2024    HDL 74.8 12/09/2024    LDLDIRECT 90 12/09/2024    ALT 16 12/05/2022    AST 16 12/05/2022     02/19/2025    K 4.0 02/19/2025     02/19/2025    CREATININE 0.71 02/19/2025    BUN 10 02/19/2025    CO2 28 02/19/2025    TSH 2.08 08/10/2024    INR 1.0 03/08/2022    HGBA1C 6.3 (H) 12/09/2024     par    ASSESSMENT AND PLAN:    Ms. De La Cruz is a 59 y.o. female  who is here for follow up s/p kidney transplant.    TRANSPLANT DATE: 3/8/2022 (Kidney)      1. ESRD S/P kidney transplant   - Creatinine last check was :  Lab Results   Component Value Date    CREATININE 0.71 02/19/2025       - Renal allograft function is stable  -Allosure last check was 4/2024 - 0.15%  -Ensure adequate hydration  - Avoid nephrotoxic medications, NSAIDs, and IV contrast.    2. Immunosuppression  -Tacrolimus level last check was 7  -Decrease Tacrolimus to 1mg AM, 1mg PM and recheck levels in 1-2 weeks. Continue MMF at same dose and ok to decrease Prednisone to 5mg every other day    3. Electrolytes  Lab Results   Component Value Date     GLUCOSE 100 (H) 02/19/2025    CALCIUM 9.6 02/19/2025     02/19/2025    K 4.0 02/19/2025    CO2 28 02/19/2025     02/19/2025    BUN 10 02/19/2025    CREATININE 0.71 02/19/2025     -Acceptable from last lab drawn    4. Hypertension  Blood Pressures         2/20/2025  0934             BP: 143/92          -Home  BP had been acceptable  -Encourage to monitor home BP  -Continue current anti hypertensive medication    5. Bone Mineral Disease/Osteoporosis  Lab Results   Component Value Date    .2 (H) 11/20/2024    CALCIUM 9.6 02/19/2025    PHOS 3.9 02/19/2025    VITD25 37 11/20/2024     - check VIT D, PTH with next lab  - Follow up with DEXA every 2 years (last one 10/2024)  - May consider initiation of Sensipar when PTH >300 AND Ca >8.4    6.Anemia  Lab Results   Component Value Date    WBC 8.6 02/19/2025    HGB 12.1 02/19/2025    HCT 40.4 02/19/2025    MCV 90 02/19/2025     (H) 02/19/2025     Lab Results   Component Value Date    IRON 49 03/11/2022    TIBC 209 (L) 03/11/2022    FERRITIN 436 (H) 03/11/2022     -asymptomatic  - Continue to monitor  -check iron studies and ferritin. Will consider DARIEN as needed.  - No indications for blood transfusion     7.Health maintenance and vaccination  - Flu shot during flu season annually  - Cancer screening is up to date per the patient - mammogram due 3/2025  Colonoscopy screening UTD  -DEXA 10/2024 Osteopenia  -Renal imaging stable    Lab : Routine transplant lab ( CBC, RFP, and anti-rejection trough level ) every 3 months  Additional labs:  VIT D, PTH with next lab  Viral screening PCR, Allosure and UPC per protocol.    Additional Plan :  -Start taking Ca-Vit D OTC supplements   -Decrease Tacrolimus to 1mg AM, 1mg PM and recheck Tac levels in 1-2 weeks  -Follow up with cardiologist for continued monitoring  -Decrease Prednisone to 5mg every other day  -Check PTH, Vit D with next labwork      RTC 6 month(s)    Ginna Zamudio,     Transplant Nephrology

## 2025-02-20 NOTE — PATIENT INSTRUCTIONS
OTC Calcium with vitamin D from [employer] ([place] brand)  Decrease prednisone to every other day  Labs every 3 months  RTC in 6 months

## 2025-02-20 NOTE — PROGRESS NOTES
TRANSPLANT NEPHROLOGY :   OUTPATIENT CLINIC NOTE      SERVICE DATE : 02/20/2025    REASON FOR VISIT/CHIEF COMPLAINT:  S/P  TRANSPLANT SURGERY  IMMUNOSUPPRESSIVE MEDICATION MANAGEMENT  BLOOD PRESSURE MANAGEMENT    HPI:    Ms. De La Cruz is a 59 y.o. female with past medical history significant for ESRD secondary to hypertensive nephrosclerosis s/p living donor kidney transplant on 3/8/2022. Patient's and donor EBV status is D negative/R positive and CMV status D-/R+. Patient was induced by Simulect and maintained on triple immunosuppression. Patient have spontaneous kidney function and did not require any dialysis post transplant her PD catheter removed along with the stent removal on 3/24/2022.       Patient is here for follow up s/p kidney transplant.    Patient is doing well overall. No new complaints. Denied chest pain, SOB, SELBY, Palpitation. Normal urination and bowel movement. Normal gait and no weakness of arms/legs. No cough, runny nose, sore throat, cold symptoms, or rash. No hearing loss. Normal vision.No problems with his sleep, mood and function. No recent infection, hospitalization, surgery or ER visits.      ROS:  Review of  14 systems was performed system by system. See HPI. Otherwise, the symptoms were negative.    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 11/24/2021    MRSA carrier    Chronic kidney disease, stage 4 (severe) (Multi) 01/21/2022    CKD stage G4/A3, GFR 15-29 and  albumin creatinine ratio >300 mg/g    Chronic kidney disease, stage 5 (Multi) 2022    CKD (chronic kidney disease) stage 5, GFR less than 15 ml/min    Cramp and spasm 10/07/2020    Nocturnal muscle cramp    Dependence on renal dialysis (CMS-[facility]) 2022    Peritoneal dialysis status    Drug induced constipation 2022    Drug-induced constipation    Encounter for general adult medical examination without abnormal findings 2017    Well adult exam    Encounter for other preprocedural examination 2022    Pre-transplant evaluation for kidney transplant    Encounter for other preprocedural examination 2022    Preop testing    Encounter for other screening for malignant neoplasm of breast 2022    Breast cancer screening    Encounter for pregnancy test, result negative 2022    Urine pregnancy test negative    End stage renal disease (Multi) 2022    ESRD on dialysis    Essential (primary) hypertension 10/26/2022    Hypertension    Other acute postprocedural pain 2022    Post-operative pain    Other disorders of phosphorus metabolism 2021    Hyperphosphatemia    Other specified counseling 2018    Grief counseling    Pain in right leg 2019    Pain of right lower extremity    Personal history of other diseases of the digestive system 2022    History of constipation    Personal history of other medical treatment 06/10/2019    History of screening mammography    Shortness of breath 2022    SOB (shortness of breath) on exertion    Unspecified astigmatism, bilateral 2022    Astigmatism of both eyes        PAST SURGICAL HISTORY:  Past Surgical History:   Procedure Laterality Date     SECTION, CLASSIC  2018     Section    MOUTH SURGERY  2018    Oral Surgery Tooth Extraction    OTHER SURGICAL HISTORY  2017    Conclusion Of Operation Implants Mesh    OTHER SURGICAL HISTORY  2022    Kidney transplantation     TUBAL LIGATION  02/02/2018    Tubal Ligation        SOCIAL HISTORY:  Social History     Socioeconomic History    Marital status:      Spouse name: Not on file    Number of children: Not on file    Years of education: Not on file    Highest education level: Not on file   Occupational History    Not on file   Tobacco Use    Smoking status: Never    Smokeless tobacco: Never   Substance and Sexual Activity    Alcohol use: Never    Drug use: Never    Sexual activity: Not on file   Other Topics Concern    Not on file   Social History Narrative    Not on file     Social Drivers of Health     Financial Resource Strain: Not on file   Food Insecurity: Not on file   Transportation Needs: Not on file   Physical Activity: Not on file   Stress: Not on file   Social Connections: Not on file   Intimate Partner Violence: Not on file   Housing Stability: Not on file       FAMILY HISTORY:  Family History   Problem Relation Name Age of Onset    Breast cancer Maternal Grandmother      Glaucoma Maternal Grandfather      Breast cancer Mother's Sister         MEDICATION LIST:  Current Outpatient Medications   Medication Instructions    acetaminophen (Tylenol) 325 mg tablet oral, Every 6 hours PRN    amLODIPine (NORVASC) 10 mg, oral, Daily, as directed    aspirin 81 mg EC tablet 1 tablet, Daily    carvedilol (Coreg) 6.25 mg tablet TAKE 1 TABLET TWICE DAILY HOLD FOR SBP<110 OR HR <55    cyanocobalamin (Vitamin B-12) 500 mcg tablet Take by mouth.    magnesium oxide (MAG-OX) 400 mg, oral, 2 times daily    mycophenolate (Cellcept) 250 mg capsule TAKE THREE (3) CAPSULES BY MOUTH EVERY 12 HOURS.    pantoprazole (ProtoNix) 40 mg EC tablet 1 tablet, Daily    predniSONE (Deltasone) 5 mg tablet TAKE ONE (1) TABLET BY MOUTH ONCE DAILY.    tacrolimus (PROGRAF) 1 mg, oral, Every 12 hours    tacrolimus (PROGRAF) 0.5 mg, oral, Every morning       ALLERGY  Allergies   Allergen Reactions    Egg Unknown    Latex Itching, Rash and Unknown    Senna  Other     cramps       PHYSICAL EXAM:    Visit Vitals  BP (!) 143/92   Pulse 88   Temp 35.9 °C (96.6 °F) (Temporal)   Wt 73.8 kg (162 lb 12.8 oz)   SpO2 96%   BMI 32.88 kg/m²   OB Status Postmenopausal   Smoking Status Never   BSA 1.75 m²          Vital signs - reviewed. Acceptable BP at this office visit.   General Appearance - NAD, Good speech, oriented and alert  HEENT - Supple. Not pale. No jaundice. No cervical lymphadenopathy. Pharynx and tonsils are not injected.  CVS - RRR. Normal S1/S2. No murmur, click , rub or gallop  Lungs- clear to auscultation bilaterally  Abdomen - soft , not tender, no guarding, no rigidity. No hepatosplenomegaly. Normal bowel sounds. No masses and ascites. S/P Kidney transplant .  Transplanted kidney is not tender.   Musculoskeletal /Extremities - no edema. Full ROM. No joint tenderness.   Neuro/Psych - appropriate mood and affect. Motor power V/V all extremities. CN I -XII were grossly intact.  Skin - No visible rash      LABS:    Lab Results   Component Value Date    WBC 8.6 02/19/2025    HGB 12.1 02/19/2025    HCT 40.4 02/19/2025     (H) 02/19/2025    CHOL 203 (H) 12/09/2024    TRIG 111 02/29/2024    HDL 74.8 12/09/2024    LDLDIRECT 90 12/09/2024    ALT 16 12/05/2022    AST 16 12/05/2022     02/19/2025    K 4.0 02/19/2025     02/19/2025    CREATININE 0.71 02/19/2025    BUN 10 02/19/2025    CO2 28 02/19/2025    TSH 2.08 08/10/2024    INR 1.0 03/08/2022    HGBA1C 6.3 (H) 12/09/2024     par    ASSESSMENT AND PLAN:    Ms. De La Cruz is a 59 y.o. female  who is here for follow up s/p kidney transplant.    TRANSPLANT DATE: 3/8/2022 (Kidney)      1. ESRD S/P kidney transplant   - Creatinine last check was :  Lab Results   Component Value Date    CREATININE 0.71 02/19/2025       - Renal allograft function is excellent  UPC ratio -WNL  -Ensure adequate hydration  - Avoid nephrotoxic medications, NSAIDs, and IV contrast.    2. Immunosuppression  -Tacrolimus level last check  was 7; will try cutting back the dose from 1.5, 1 to 1 mg bid and recheck level in 1-2 weeks  -Continue current immunosuppression regimen.    3. Electrolytes  Lab Results   Component Value Date    GLUCOSE 100 (H) 02/19/2025    CALCIUM 9.6 02/19/2025     02/19/2025    K 4.0 02/19/2025    CO2 28 02/19/2025     02/19/2025    BUN 10 02/19/2025    CREATININE 0.71 02/19/2025     -Acceptable from last lab drawn    4. Hypertension  Blood Pressures         2/20/2025  0934             BP: 143/92          -Home  BP had been acceptable  -Encourage to monitor home BP  -Continue current anti hypertensive medication    5. Bone Mineral Disease/Osteoporosis  Lab Results   Component Value Date    .2 (H) 11/20/2024    CALCIUM 9.6 02/19/2025    PHOS 3.9 02/19/2025    VITD25 37 11/20/2024     -check VIT D, PTH q 3 months  - Consider DEXA every 2-3 years , defer to PCP-->2024: osteopenia  Starting OTC Ca/Vit D  - May consider initiation of Sensipar when PTH >300 AND Ca >8.4    6.Anemia  Lab Results   Component Value Date    WBC 8.6 02/19/2025    HGB 12.1 02/19/2025    HCT 40.4 02/19/2025    MCV 90 02/19/2025     (H) 02/19/2025     Lab Results   Component Value Date    IRON 49 03/11/2022    TIBC 209 (L) 03/11/2022    FERRITIN 436 (H) 03/11/2022     -asymptomatic  - Continue to monitor  -check iron studies and ferritin. Will consider DARIEN as needed.  - No indications for blood transfusion     7.Health maintenance and vaccination  - Flu shot during flu season annually  - Cancer screening is up to date per the patient    Summary  -Will try cutting back the dose from 1.5, 1 to 1 mg bid and recheck level in 1-2 weeks  -Continue current immunosuppression regimen.  - OTC Ca/Vit D  -Routine tx lab q 3 months  RTC 6 Months    Danette Garrido MD    Transplant Nephrology

## 2025-02-27 ENCOUNTER — HOSPITAL ENCOUNTER (EMERGENCY)
Facility: HOSPITAL | Age: 60
Discharge: HOME | End: 2025-02-27
Payer: COMMERCIAL

## 2025-02-27 ENCOUNTER — APPOINTMENT (OUTPATIENT)
Dept: RADIOLOGY | Facility: HOSPITAL | Age: 60
End: 2025-02-27
Payer: COMMERCIAL

## 2025-02-27 VITALS
TEMPERATURE: 97.5 F | SYSTOLIC BLOOD PRESSURE: 149 MMHG | HEART RATE: 96 BPM | HEIGHT: 59 IN | DIASTOLIC BLOOD PRESSURE: 89 MMHG | BODY MASS INDEX: 32.25 KG/M2 | RESPIRATION RATE: 17 BRPM | WEIGHT: 160 LBS | OXYGEN SATURATION: 100 %

## 2025-02-27 DIAGNOSIS — M79.604 RIGHT LEG PAIN: Primary | ICD-10-CM

## 2025-02-27 PROCEDURE — 99284 EMERGENCY DEPT VISIT MOD MDM: CPT | Mod: 25

## 2025-02-27 PROCEDURE — 93971 EXTREMITY STUDY: CPT | Performed by: RADIOLOGY

## 2025-02-27 PROCEDURE — 93971 EXTREMITY STUDY: CPT

## 2025-02-27 ASSESSMENT — PAIN - FUNCTIONAL ASSESSMENT: PAIN_FUNCTIONAL_ASSESSMENT: 0-10

## 2025-02-27 ASSESSMENT — COLUMBIA-SUICIDE SEVERITY RATING SCALE - C-SSRS
2. HAVE YOU ACTUALLY HAD ANY THOUGHTS OF KILLING YOURSELF?: NO
6. HAVE YOU EVER DONE ANYTHING, STARTED TO DO ANYTHING, OR PREPARED TO DO ANYTHING TO END YOUR LIFE?: NO
1. IN THE PAST MONTH, HAVE YOU WISHED YOU WERE DEAD OR WISHED YOU COULD GO TO SLEEP AND NOT WAKE UP?: NO

## 2025-02-27 ASSESSMENT — PAIN DESCRIPTION - LOCATION: LOCATION: LEG

## 2025-02-27 ASSESSMENT — PAIN SCALES - GENERAL: PAINLEVEL_OUTOF10: 7

## 2025-02-27 ASSESSMENT — PAIN DESCRIPTION - ORIENTATION: ORIENTATION: RIGHT

## 2025-02-27 NOTE — ED TRIAGE NOTES
C/O RLE edema/erythema, denies known injury, -blood thinners, -smoker, denies recent long travels, ambulated to triage gait steady, denies cp/sob/palpitations

## 2025-02-27 NOTE — ED PROVIDER NOTES
HPI   Chief Complaint   Patient presents with    DVT R/O       This pleasant 59-year-old female concerned about a DVT in right lower extremity was seen by her primary care who recommended she come for evaluation.  Nothing makes it better or worse denies other complaints.  No chest pain or shortness of breath.  No history of DVT.              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    MRSA carrier    Chronic kidney disease, stage 4 (severe) (Multi) 01/21/2022    CKD stage G4/A3, GFR 15-29 and albumin creatinine ratio >300 mg/g    Chronic kidney disease, stage 5 (Multi) 04/07/2022    CKD (chronic kidney disease) stage 5, GFR less than 15 ml/min    Cramp and spasm 10/07/2020    Nocturnal muscle cramp    Dependence on renal dialysis (CMS-[facility]) 01/21/2022    Peritoneal dialysis status    Drug induced constipation 03/09/2022    Drug-induced constipation    Encounter for general adult medical examination without abnormal findings 07/25/2017    Well adult exam    Encounter for other preprocedural examination 03/09/2022    Pre-transplant evaluation for kidney transplant    Encounter for other preprocedural examination 03/09/2022    Preop testing    Encounter for other screening for malignant neoplasm of breast 03/09/2022    Breast cancer screening    Encounter for pregnancy test, result negative 03/09/2022    Urine pregnancy test negative    End stage renal disease (Multi) 01/21/2022    ESRD on  dialysis    Essential (primary) hypertension 10/26/2022    Hypertension    Other acute postprocedural pain 2022    Post-operative pain    Other disorders of phosphorus metabolism 2021    Hyperphosphatemia    Other specified counseling 2018    Grief counseling    Pain in right leg 2019    Pain of right lower extremity    Personal history of other diseases of the digestive system 2022    History of constipation    Personal history of other medical treatment 06/10/2019    History of screening mammography    Shortness of breath 2022    SOB (shortness of breath) on exertion    Unspecified astigmatism, bilateral 2022    Astigmatism of both eyes     Past Surgical History:   Procedure Laterality Date     SECTION, CLASSIC  2018     Section    MOUTH SURGERY  2018    Oral Surgery Tooth Extraction    OTHER SURGICAL HISTORY  2017    Conclusion Of Operation Implants Mesh    OTHER SURGICAL HISTORY  2022    Kidney transplantation    TUBAL LIGATION  2018    Tubal Ligation     Family History   Problem Relation Name Age of Onset    Breast cancer Maternal Grandmother      Glaucoma Maternal Grandfather      Breast cancer Mother's Sister       Social History     Tobacco Use    Smoking status: Never    Smokeless tobacco: Never   Substance Use Topics    Alcohol use: Never    Drug use: Never       Physical Exam   ED Triage Vitals [25 1118]   Temperature Heart Rate Respirations BP   36.4 °C (97.5 °F) 96 17 149/89      Pulse Ox Temp src Heart Rate Source Patient Position   100 % -- -- --      BP Location FiO2 (%)     -- --       Physical Exam  Vitals and nursing note reviewed.   Constitutional:       General: She is not in acute distress.     Appearance: Normal appearance. She is normal weight. She is not ill-appearing, toxic-appearing or diaphoretic.   HENT:      Head: Normocephalic and atraumatic.      Right Ear: External ear normal.      Left Ear:  External ear normal.      Nose: Nose normal.      Mouth/Throat:      Mouth: Mucous membranes are moist.   Eyes:      Extraocular Movements: Extraocular movements intact.      Conjunctiva/sclera: Conjunctivae normal.      Pupils: Pupils are equal, round, and reactive to light.   Cardiovascular:      Rate and Rhythm: Normal rate and regular rhythm.   Pulmonary:      Effort: Pulmonary effort is normal. No respiratory distress.      Breath sounds: No stridor.   Abdominal:      General: There is no distension.      Tenderness: There is no right CVA tenderness, left CVA tenderness, guarding or rebound.   Musculoskeletal:         General: Swelling and tenderness present. No deformity.      Cervical back: Normal range of motion. No tenderness.      Comments: Minimal tenderness of the right leg with some swelling.  No discoloration neurovascular intact.   Skin:     General: Skin is warm.      Capillary Refill: Capillary refill takes less than 2 seconds.      Coloration: Skin is not jaundiced or pale.      Findings: No bruising or rash.   Neurological:      General: No focal deficit present.      Mental Status: She is alert and oriented to person, place, and time. Mental status is at baseline.      Cranial Nerves: No cranial nerve deficit.      Sensory: No sensory deficit.   Psychiatric:         Mood and Affect: Mood normal.         Behavior: Behavior normal.         Thought Content: Thought content normal.         Judgment: Judgment normal.           ED Course & MDM   Diagnoses as of 02/27/25 1756   Right leg pain                 No data recorded                                 Medical Decision Making  Differential diagnosis of DVT versus cellulitis versus muscle strain.    On further exam she seems to have radiation of her pain from her right SI down her lateral right leg towards her foot.  Considering ultrasounds negative I suspect musculoskeletal cause may have sciatica without back pain causing her symptoms.  She has no  deficits or red flags plan to discharge with reassurance    Risk  OTC drugs.        Procedure  Procedures     Gagan Correa PA-C  02/27/25 [id number]

## 2025-03-10 ENCOUNTER — APPOINTMENT (OUTPATIENT)
Dept: RADIOLOGY | Facility: CLINIC | Age: 60
End: 2025-03-10
Payer: COMMERCIAL

## 2025-03-10 ENCOUNTER — HOSPITAL ENCOUNTER (OUTPATIENT)
Dept: RADIOLOGY | Facility: CLINIC | Age: 60
Discharge: HOME | End: 2025-03-10
Payer: COMMERCIAL

## 2025-03-10 DIAGNOSIS — Z12.31 ENCOUNTER FOR SCREENING MAMMOGRAM FOR BREAST CANCER: ICD-10-CM

## 2025-03-10 PROCEDURE — 77067 SCR MAMMO BI INCL CAD: CPT

## 2025-03-10 PROCEDURE — 77067 SCR MAMMO BI INCL CAD: CPT | Performed by: RADIOLOGY

## 2025-03-10 PROCEDURE — 77063 BREAST TOMOSYNTHESIS BI: CPT | Performed by: RADIOLOGY

## 2025-03-11 ENCOUNTER — SPECIALTY PHARMACY (OUTPATIENT)
Dept: PHARMACY | Facility: CLINIC | Age: 60
End: 2025-03-11

## 2025-03-11 PROCEDURE — RXMED WILLOW AMBULATORY MEDICATION CHARGE

## 2025-03-13 ENCOUNTER — TELEPHONE (OUTPATIENT)
Facility: HOSPITAL | Age: 60
End: 2025-03-13
Payer: COMMERCIAL

## 2025-03-13 ENCOUNTER — PHARMACY VISIT (OUTPATIENT)
Dept: PHARMACY | Facility: CLINIC | Age: 60
End: 2025-03-13
Payer: COMMERCIAL

## 2025-03-13 DIAGNOSIS — Z94.0 KIDNEY REPLACED BY TRANSPLANT (HHS-HCC): ICD-10-CM

## 2025-03-13 DIAGNOSIS — E55.9 VITAMIN D DEFICIENCY: ICD-10-CM

## 2025-03-13 NOTE — TELEPHONE ENCOUNTER
Called and spoke to patient. Advised she went to lab to get her tac rechecked but since her orders are for every 3 months they couldn't draw her labs.  Placed a one time order in for the patient's labs.

## 2025-03-13 NOTE — TELEPHONE ENCOUNTER
Patient called requesting to speak with coordinator about  having lab orders placed .  Patient call back number is [phone] .

## 2025-03-14 ENCOUNTER — LAB (OUTPATIENT)
Dept: LAB | Facility: HOSPITAL | Age: 60
End: 2025-03-14
Payer: COMMERCIAL

## 2025-03-14 ENCOUNTER — OFFICE VISIT (OUTPATIENT)
Dept: CARDIOLOGY | Facility: CLINIC | Age: 60
End: 2025-03-14
Payer: COMMERCIAL

## 2025-03-14 VITALS
RESPIRATION RATE: 18 BRPM | BODY MASS INDEX: 31.65 KG/M2 | HEIGHT: 59 IN | WEIGHT: 157 LBS | SYSTOLIC BLOOD PRESSURE: 144 MMHG | DIASTOLIC BLOOD PRESSURE: 86 MMHG | OXYGEN SATURATION: 100 % | HEART RATE: 76 BPM

## 2025-03-14 DIAGNOSIS — Z94.0 KIDNEY REPLACED BY TRANSPLANT (HHS-HCC): ICD-10-CM

## 2025-03-14 DIAGNOSIS — D84.9 IMMUNOSUPPRESSION: ICD-10-CM

## 2025-03-14 DIAGNOSIS — E78.5 DYSLIPIDEMIA: ICD-10-CM

## 2025-03-14 DIAGNOSIS — Z94.0 KIDNEY TRANSPLANT STATUS: Primary | ICD-10-CM

## 2025-03-14 DIAGNOSIS — I10 ESSENTIAL HYPERTENSION: Primary | ICD-10-CM

## 2025-03-14 LAB
ALBUMIN SERPL BCP-MCNC: 4.5 G/DL (ref 3.4–5)
ANION GAP SERPL CALC-SCNC: 15 MMOL/L (ref 10–20)
ATRIAL RATE: 76 BPM
BUN SERPL-MCNC: 9 MG/DL (ref 6–23)
CALCIUM SERPL-MCNC: 10.3 MG/DL (ref 8.6–10.6)
CHLORIDE SERPL-SCNC: 104 MMOL/L (ref 98–107)
CO2 SERPL-SCNC: 27 MMOL/L (ref 21–32)
CREAT SERPL-MCNC: 0.68 MG/DL (ref 0.5–1.05)
EGFRCR SERPLBLD CKD-EPI 2021: >90 ML/MIN/1.73M*2
ERYTHROCYTE [DISTWIDTH] IN BLOOD BY AUTOMATED COUNT: 14.6 % (ref 11.5–14.5)
GLUCOSE SERPL-MCNC: 114 MG/DL (ref 74–99)
HCT VFR BLD AUTO: 41.6 % (ref 36–46)
HGB BLD-MCNC: 12.1 G/DL (ref 12–16)
MAGNESIUM SERPL-MCNC: 1.91 MG/DL (ref 1.6–2.4)
MCH RBC QN AUTO: 26.8 PG (ref 26–34)
MCHC RBC AUTO-ENTMCNC: 29.1 G/DL (ref 32–36)
MCV RBC AUTO: 92 FL (ref 80–100)
NRBC BLD-RTO: 0 /100 WBCS (ref 0–0)
P AXIS: 60 DEGREES
P OFFSET: 201 MS
P ONSET: 152 MS
PHOSPHATE SERPL-MCNC: 3.8 MG/DL (ref 2.5–4.9)
PLATELET # BLD AUTO: 491 X10*3/UL (ref 150–450)
POTASSIUM SERPL-SCNC: 4.6 MMOL/L (ref 3.5–5.3)
PR INTERVAL: 146 MS
Q ONSET: 225 MS
QRS COUNT: 12 BEATS
QRS DURATION: 66 MS
QT INTERVAL: 364 MS
QTC CALCULATION(BAZETT): 409 MS
QTC FREDERICIA: 393 MS
R AXIS: 2 DEGREES
RBC # BLD AUTO: 4.52 X10*6/UL (ref 4–5.2)
SODIUM SERPL-SCNC: 141 MMOL/L (ref 136–145)
T AXIS: 18 DEGREES
T OFFSET: 407 MS
TACROLIMUS BLD-MCNC: 6.4 NG/ML
VENTRICULAR RATE: 76 BPM
WBC # BLD AUTO: 6.9 X10*3/UL (ref 4.4–11.3)

## 2025-03-14 PROCEDURE — 83735 ASSAY OF MAGNESIUM: CPT

## 2025-03-14 PROCEDURE — 93005 ELECTROCARDIOGRAM TRACING: CPT | Performed by: INTERNAL MEDICINE

## 2025-03-14 PROCEDURE — 3077F SYST BP >= 140 MM HG: CPT | Performed by: INTERNAL MEDICINE

## 2025-03-14 PROCEDURE — 3008F BODY MASS INDEX DOCD: CPT | Performed by: INTERNAL MEDICINE

## 2025-03-14 PROCEDURE — 99204 OFFICE O/P NEW MOD 45 MIN: CPT | Performed by: INTERNAL MEDICINE

## 2025-03-14 PROCEDURE — 80069 RENAL FUNCTION PANEL: CPT

## 2025-03-14 PROCEDURE — 80197 ASSAY OF TACROLIMUS: CPT

## 2025-03-14 PROCEDURE — 85027 COMPLETE CBC AUTOMATED: CPT

## 2025-03-14 PROCEDURE — 99214 OFFICE O/P EST MOD 30 MIN: CPT | Performed by: INTERNAL MEDICINE

## 2025-03-14 PROCEDURE — 3079F DIAST BP 80-89 MM HG: CPT | Performed by: INTERNAL MEDICINE

## 2025-03-14 PROCEDURE — 36415 COLL VENOUS BLD VENIPUNCTURE: CPT

## 2025-03-14 RX ORDER — ATORVASTATIN CALCIUM 40 MG/1
40 TABLET, FILM COATED ORAL DAILY
Qty: 30 TABLET | Refills: 11 | Status: SHIPPED | OUTPATIENT
Start: 2025-03-14 | End: 2026-03-14

## 2025-03-14 ASSESSMENT — ENCOUNTER SYMPTOMS: DEPRESSION: 0

## 2025-03-14 NOTE — ASSESSMENT & PLAN NOTE
Initiate atorvastatin 40 mg  -- Repeat laboratory studies prior to next appointment for monitoring drug effect

## 2025-03-14 NOTE — PATIENT INSTRUCTIONS
"It was a pleasure seeing you today. I would like to see you back in clinic in  3  months. You can call my office if questions arise between now and our next visit.     Today, we talked about your cholesterol levels  -- We will start you on Atorvastatin at 40 mg Po Daily   --Please obtain cholesterol levels about 1 week before next appointment.     We will perform an echocardiogram roughly late May to reassess your heart function in the setting of end-stage renal disease status post transplant as well as essential hypertension.        [id number]  --> exercise with Mindy       Can I lower my cholesterol by changing my diet?   Maybe. Some people can lower their cholesterol by changing their diet. But this does not always work. Still, you can improve your overall health by eating better.  If you have high cholesterol, it might help to avoid or limit saturated fats. These are found in foods like:  ?Red meat  ?Butter  ?Fried foods  ?Cheese  ?Baked goods, such as cookies, cakes, or brownies  Other things that might help lower cholesterol include:  ?Eating more soluble fiber - Soluble fiber is found in fruits, oats, barley, beans, and peas.  ?A vegetarian or vegan diet - A vegetarian diet contains no meat. A vegan diet contains no animal products at all, including meat, eggs, or milk.  ?Replacing meat with soy sometimes - Soy-based products include tofu and tempeh.  In general, you can improve your health by eating lots of fruits, vegetables, and whole grains. You can also cut back on carbohydrates, sweets, and processed foods.  Eating a \"Mediterranean diet\" might help lower your cholesterol. This type of diet:  ?Includes a lot of fruits, vegetables, nuts, and whole grains  ?Uses olive oil instead of other fats  ?Includes some fish, poultry, and dairy products, but not a lot of red meat  What about eggs?   Eggs are OK if you want to eat them, but don't overdo it. The news often has stories about the health benefits or " "risks of eggs. The truth is, eggs are a good source of protein and do not raise cholesterol much. Saturated fats raise cholesterol levels more than eggs do.  Are there specific foods that can lower my cholesterol?   Maybe. There are some foods that seem to help lower cholesterol, including:  ?Foods rich in omega-3 fatty acids - Studies show that people who eat lots of these foods are less likely to have heart disease than those who eat less of them. Examples include oily fish (such as salmon, herring, or tuna), olive oil, and canola oil. It's fine to eat 1 to 2 servings of oily fish a week.  ?Nuts - Some studies show that eating certain nuts can help lower cholesterol. They might even lower the risk of heart attack or death. These nuts include walnuts, almonds, and pistachios.  ?Fiber-rich foods - These foods seem to lower cholesterol and are generally good for your health. Examples include fruits, vegetables, beans, and oats. Some doctors even recommend taking fiber supplements.  What about  foods that claim to lower cholesterol?   Be careful with these foods. There are now many foods that have added plant extracts called \"sterols\" or \"stanols.\" Examples include special margarines such as Benecol. Foods with added sterols or stanols can lower cholesterol. But it's not clear whether they help lower the risk of heart attack or stroke, or if they are safe to use long-term. Plus, research in animals shows that these extracts might actually cause health problems. Experts think more research is needed before they can recommend that people eat these foods.  Should I take supplements to lower my cholesterol?   Maybe. Some research has shown that certain supplements can lower cholesterol. But there is almost no research showing that supplements can help prevent heart attacks, strokes, or any of the problems caused by high cholesterol. If you decide to try supplements, keep in mind that in the [place], the government does " "not regulate supplements very well. That means that what's on a supplement's label is not always actually in the bottle.  Here are some supplements that might help with cholesterol:  ?Red yeast rice - Red yeast rice helps lower cholesterol. It might contain monacolin K, which is the same ingredient that is in a prescription medicine to lower cholesterol. But the supplements that you can buy might not always have much monacolin K. If you are interested in taking red yeast rice, talk to your doctor to see if the prescription medicine is a better choice.  ?Omega-3 fatty acid supplements - Some omega-3 fatty acid supplements might help lower cholesterol, but they might also raise it.  What supplements don't work?   There is no good evidence that calcium, garlic, coconut oil, coconut water, resveratrol, policosanol, or soy isoflavone supplements lower cholesterol.    Below are some Heart Health Tips that we provide to all of our patients. I hope you fing them useful.     - If you are having problems with medications, consider looking at the following websites.   --  \"via680\"   --  \"[facility]\"      - We are happy to supply written prescriptions if needed to allow you to obtain your medications from different pharmacies. Additionally, if you are having issues with mail order delivery, please let us know. We can send a limited supply of your medications to your local pharmacy.     -  We recommend you follow a heart healthy diet. Watch food labels and try not to eat more than 2,500 mg of sodium per day. Avoid foods high in salt like processed meats (lunch meats, cassidy, and sausage), processed foods (boxed dinners, canned soups), fried and fast foods. Monitor serving sizes and if the sodium per serving size is more than 200 mg, avoid those foods. If the sodium per serving size is between 100-200 mg, you can use those in limited quantities. Try to choose foods where the amount of sodium per serving size " is less than 100 mg. Try to eat a diet rich in fruits and vegetables, whole grains, low fat dairy products, skinless poultry and fish, nuts, beans, non-tropical vegetable oils. Limit saturated fat, trans fat, sodium, red meats, and sugar-sweetened beverages.   Limit alcohol     -The combination of a reduced-calorie diet and increased physical activity is recommended. Adults should aim to get at least 150 minutes of moderate physical activity per week (30 minutes of moderate physical activities at least 5 days per week). Examples of moderate physical activities include brisk walking, swimming, aerobic dancing, heavy gardening, jumping rope, bicycling 10 MPH or faster, tennis, hiking uphill or with a heavy backpack. Please let us know if you would like to learn more about your nutrition and calories and additional options including weight loss programs to help you reach your goal.     -If you smoke, stop smoking. If you stop smoking you can help get rid of a major source of stress to your heart. Smoking makes your heart rate and blood pressure go up and increases your risk or developing cardiovascular diseases and worsen symptoms associated with heart failure.     -Obtain a BP monitor and monitor your BP daily. Check it around the same time each day; at least 1 hour after taking your medications. Record your BP in a log and bring your log with you to your doctors appointment.     -F/u with your PCP as recommended.

## 2025-03-14 NOTE — PROGRESS NOTES
Referred by Jeremie Paniagua DO for New Patient Visit and Hypertension     HPI:    Farzana De La Cruz is a 59 y.o. 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), Chronic kidney disease, stage 4 (severe) (Multi) (01/21/2022), Chronic kidney disease, stage 5 (Multi) (04/07/2022), Cramp and spasm (10/07/2020), Dependence on renal dialysis (CMS-HCC) (01/21/2022), Drug induced constipation (03/09/2022), Encounter for general adult medical examination without abnormal findings (07/25/2017), Encounter for other preprocedural examination (03/09/2022), Encounter for other preprocedural examination (03/09/2022), Encounter for other screening for malignant neoplasm of breast (03/09/2022), Encounter for pregnancy test, result negative (03/09/2022), End stage renal disease (Multi) (01/21/2022), Essential (primary) hypertension (10/26/2022), Other acute postprocedural pain (03/09/2022), Other disorders of phosphorus metabolism (06/28/2021), Other specified counseling (12/05/2018), Pain in right leg (06/25/2019), Personal history of other diseases of the digestive system (01/12/2022), Personal history of other medical treatment (06/10/2019), Shortness of breath (04/21/2022), and Unspecified astigmatism, bilateral (09/02/2022). who presents to cardiology clinic to establish care.     3/14/2025--> She presents to establish care.  She is a recipient of a live donor kidney transplant for end-stage renal disease.  As part of workup, she has been followed by the transplant team, but has not had a cardiologist.  She has  been prompted multiple times that she should have 1 due to increased risk factors for coronary artery disease.  She has a longstanding history of dyslipidemia.  She has however not been initiated on statin therapy.      At baseline, she reports that she is reemerging from her winter hibernation of activities.  She is starting to walk more, has taken more stairs.  She notes mild shortness of breath that she attributes to being out of shape, but is keeping an eye on this.  She is interested in other lifestyle changes that she can of active to help address about her lifestyle and is interested in online exercise modalities.        No exacerbating or relieving factors.  Patient denies chest pain and angina.  Pt denies orthopnea, and paroxysmal nocturnal dyspnea.  Pt denies worsening lower extremity edema.  Pt denies palpitations or syncope.  No recent falls.  No fever or chills.  No cough.  No change in bowel or bladder habits.  No sick contacts.  No recent travel.    12 point review of systems including (Constitutional, Eyes, ENMT, Respiratory, Cardiac, Gastrointestinal, Neurological, Psychiatric, and Hematologic) was performed and is otherwise negative.    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 (11/24/2021), Chronic kidney disease, stage 4 (severe) (Multi) (01/21/2022), Chronic kidney disease, stage 5 (Multi) (04/07/2022), Cramp and spasm (10/07/2020), Dependence on renal dialysis (CMS-[facility]) (01/21/2022), Drug induced constipation (03/09/2022), Encounter for general adult medical examination without abnormal  findings (2017), Encounter for other preprocedural examination (2022), Encounter for other preprocedural examination (2022), Encounter for other screening for malignant neoplasm of breast (2022), Encounter for pregnancy test, result negative (2022), End stage renal disease (Multi) (2022), Essential (primary) hypertension (10/26/2022), Other acute postprocedural pain (2022), Other disorders of phosphorus metabolism (2021), Other specified counseling (2018), Pain in right leg (2019), Personal history of other diseases of the digestive system (2022), Personal history of other medical treatment (06/10/2019), Shortness of breath (2022), and Unspecified astigmatism, bilateral (2022).    Past surgical history reviewed:   has a past surgical history that includes Other surgical history (2017); Other surgical history (2022);  section, classic (2018); Tubal ligation (2018); and Mouth surgery (2018).    Social history reviewed:   reports that she has never smoked. She has never used smokeless tobacco. She reports that she does not drink alcohol and does not use drugs.     Family history reviewed:    Family History   Problem Relation Name Age of Onset    Breast cancer Maternal Grandmother      Glaucoma Maternal Grandfather      Breast cancer Mother's Sister         Allergies reviewed: Egg, Latex, and Senna     Medications reviewed:   Current Outpatient Medications   Medication Instructions    acetaminophen (Tylenol) 325 mg tablet Every 6 hours PRN    amLODIPine (NORVASC) 10 mg, oral, Daily, as directed    aspirin 81 mg EC tablet 1 tablet, Daily    atorvastatin (LIPITOR) 40 mg, oral, Daily    carvedilol (Coreg) 6.25 mg tablet TAKE 1 TABLET TWICE DAILY HOLD FOR SBP<110 OR HR <55    cyanocobalamin (Vitamin B-12) 500 mcg tablet Take by mouth.    magnesium oxide (MAG-OX) 400 mg, oral, 2 times daily    mycophenolate  "(Cellcept) 250 mg capsule TAKE THREE (3) CAPSULES BY MOUTH EVERY 12 HOURS.    pantoprazole (ProtoNix) 40 mg EC tablet 1 tablet, Daily    predniSONE (DELTASONE) 5 mg, oral, Every other day    tacrolimus (PROGRAF) 1 mg, oral, Every 12 hours    tacrolimus (PROGRAF) 0.5 mg, oral, Every morning        Vitals reviewed: Visit Vitals  /86 (BP Location: Left arm, Patient Position: Sitting, BP Cuff Size: Adult)   Pulse 76   Resp 18       Physical Exam:   /86 (BP Location: Left arm, Patient Position: Sitting, BP Cuff Size: Adult)   Pulse 76   Resp 18   Ht 1.499 m (4' 11\")   Wt 71.2 kg (157 lb)   SpO2 100%   BMI 31.71 kg/m²   General:  Patient is awake, alert, and oriented.  Patient is in no acute distress.  HEENT:  Pupils equal and reactive.  Normocephalic.  Moist mucosa.    Neck:  No thyromegaly.  Normal Jugular Venous Pressure.  Cardiovascular:  Regular rate and rhythm.  Normal S1 and S2.  1/6 DOMENICA.  Pulmonary:  Clear to auscultation bilaterally.  Abdomen:  Soft. Non-tender.   Non-distended.  Positive bowel sounds.  Lower Extremities:  2+ pedal pulses. No LE edema.  Neurologic:  Cranial nerves intact.  No focal deficit.   Skin: Skin warm and dry, normal skin turgor.   Psychiatric: Normal affect.    Last Labs:  CBC -      Lab Results   Component Value Date    WBC 8.6 02/19/2025    HGB 12.1 02/19/2025    HCT 40.4 02/19/2025     (H) 02/19/2025        CMP-  Lab Results   Component Value Date    GLUCOSE 100 (H) 02/19/2025     02/19/2025    K 4.0 02/19/2025     02/19/2025    CO2 28 02/19/2025    ANIONGAP 12 02/19/2025    BUN 10 02/19/2025    CREATININE 0.71 02/19/2025    EGFR >90 02/19/2025    CALCIUM 9.6 02/19/2025    PHOS 3.9 02/19/2025    PROT 7.7 12/05/2022    ALBUMIN 4.3 02/19/2025    AST 16 12/05/2022    ALT 16 12/05/2022    ALKPHOS 74 12/05/2022    BILITOT 0.3 12/05/2022        LIPIDS-  Lab Results   Component Value Date    CHOL 203 (H) 12/09/2024    TRIG 111 02/29/2024    HDL 74.8 " "12/09/2024    CHHDL 2.7 12/09/2024    VLDL 22 02/29/2024        OTHERS-  Lab Results   Component Value Date    HGBA1C 6.3 (H) 12/09/2024        I personally reviewed the patient's recent vitals, labs, medications, orders, EKGs, pertinent cardiac imaging/ echocardiography and ischemic evaluations including stress testing/ cardiac catheterization.    Assessment and Plan:  Problem List Items Addressed This Visit       Dyslipidemia    Overview     The 10-year ASCVD risk score (Jenaro SEYMOUR, et al., 2019) is: 16.5%    Values used to calculate the score:      Age: 59 years      Sex: Female      Is Non- : Yes      Diabetic: Yes      Tobacco smoker: No      Systolic Blood Pressure: 149 mmHg      Is BP treated: Yes      HDL Cholesterol: 74.8 mg/dL      Total Cholesterol: 203 mg/dL\    Lab Results   Component Value Date    CHOL 203 (H) 12/09/2024    CHOL 192 02/29/2024    CHOL 211 (H) 07/12/2023     Lab Results   Component Value Date    HDL 74.8 12/09/2024    HDL 74.6 02/29/2024    HDL 77.8 07/12/2023     Lab Results   Component Value Date    LDLCALC 95 02/29/2024     Lab Results   Component Value Date    TRIG 111 02/29/2024    TRIG 149 06/02/2022    TRIG 133 10/08/2020     No components found for: \"CHOLHDL\"           Current Assessment & Plan     Initiate atorvastatin 40 mg  -- Repeat laboratory studies prior to next appointment for monitoring drug effect         Relevant Medications    atorvastatin (Lipitor) 40 mg tablet    Other Relevant Orders    Lipid Panel    Lipoprotein a    Essential hypertension - Primary    Overview     Formatting of this note might be different from the original. mild Creatinine clearance from [place]: 64 ml/min GFR from MDRD: greater than 60 weight 64.8 kg, age 43 yr, cr=1.19 on 10/29/2009, race African American         Relevant Orders    ECG 12 lead (Clinic Performed)    Lipid Panel    Lipoprotein a    Transthoracic echo (TTE) complete    Immunosuppression    Overview "     living donor kidney transplant on 3/8/2022. Patient's and donor EBV status is D negative/R positive and CMV status D-/R+.   Allosure last check was 4/2024 - 0.15%   Tacrolimus to 1mg AM, 1mg PM  ( 02/2025 )    mg BID ( 02/2025)   Prednisone to 5mg every other day ( 02/2025)             Kidney replaced by transplant ([facility])    Overview     living donor kidney transplant on 3/8/2022. Patient's and donor EBV status is D negative/R positive and CMV status D-/R+.   Allosure last check was 4/2024 - 0.15%   Tacrolimus to 1mg AM, 1mg PM  ( 02/2025 )    mg BID ( 02/2025)   Prednisone to 5mg every other day ( 02/2025)          Relevant Orders    Transthoracic echo (TTE) complete         Please followup with me in Cardiology clinic within the next 3 months needs relocated.  Please return to clinic sooner or seek emergent care if your symptoms reoccur or worsen.    Thank you for allowing me to participate in their care.  Please feel free to call me with any further questions or concerns.        Jeremie Paniagua DO   Division of Cardiovascular Medicine  [facility] [place], [facility]

## 2025-04-01 ENCOUNTER — TELEPHONE (OUTPATIENT)
Facility: HOSPITAL | Age: 60
End: 2025-04-01
Payer: COMMERCIAL

## 2025-04-01 NOTE — TELEPHONE ENCOUNTER
Returned pts call. Pt states she drank matcha tea this morning at 9 am and has been experiencing n/v since. Denies stomach pain or fever. States she can't keep down water. Will review with MD and call pt back    [id number] reviewed with Dr Garrido- if pts symptoms don't get better in next few hours go to local urgent care or ER for eval     Called pt- pt agreeable with plan

## 2025-04-01 NOTE — TELEPHONE ENCOUNTER
Patient called requesting to speak with coordinator about  dealing with a lot of nausea and vomiting today .  Patient call back number is [phone] .

## 2025-04-04 DIAGNOSIS — I10 ESSENTIAL HYPERTENSION: Primary | ICD-10-CM

## 2025-04-07 ENCOUNTER — APPOINTMENT (OUTPATIENT)
Dept: OBSTETRICS AND GYNECOLOGY | Facility: CLINIC | Age: 60
End: 2025-04-07
Payer: COMMERCIAL

## 2025-04-07 ENCOUNTER — SPECIALTY PHARMACY (OUTPATIENT)
Dept: PHARMACY | Facility: CLINIC | Age: 60
End: 2025-04-07

## 2025-04-07 VITALS
WEIGHT: 159.4 LBS | BODY MASS INDEX: 32.13 KG/M2 | DIASTOLIC BLOOD PRESSURE: 80 MMHG | HEIGHT: 59 IN | SYSTOLIC BLOOD PRESSURE: 135 MMHG

## 2025-04-07 DIAGNOSIS — Z01.419 ENCOUNTER FOR ANNUAL ROUTINE GYNECOLOGICAL EXAMINATION: Primary | ICD-10-CM

## 2025-04-07 PROCEDURE — 3079F DIAST BP 80-89 MM HG: CPT | Performed by: OBSTETRICS & GYNECOLOGY

## 2025-04-07 PROCEDURE — 3075F SYST BP GE 130 - 139MM HG: CPT | Performed by: OBSTETRICS & GYNECOLOGY

## 2025-04-07 PROCEDURE — 3008F BODY MASS INDEX DOCD: CPT | Performed by: OBSTETRICS & GYNECOLOGY

## 2025-04-07 PROCEDURE — RXMED WILLOW AMBULATORY MEDICATION CHARGE

## 2025-04-07 PROCEDURE — 88141 CYTOPATH C/V INTERPRET: CPT | Performed by: PATHOLOGY

## 2025-04-07 PROCEDURE — 87624 HPV HI-RISK TYP POOLED RSLT: CPT

## 2025-04-07 PROCEDURE — 88175 CYTOPATH C/V AUTO FLUID REDO: CPT

## 2025-04-07 PROCEDURE — 99396 PREV VISIT EST AGE 40-64: CPT | Performed by: OBSTETRICS & GYNECOLOGY

## 2025-04-07 NOTE — PROGRESS NOTES
59-year-old obese -3-0-3 -American woman presents today for annual GYN exam.  She has been amenorrheic with now more manageable menopause symptoms, however she still has bothersome night sweats.      The patient had a renal transplant in 2022.     Has a history of abnormal Pap smears.    - LGSIL w/positive HR HPV    - LGSIL with negative high risk HPV testing    - colposcopy negative    - ASCUS with negative high risk HPV testing    - negative Pap negative HPV testing    - colposcopy negative    - ASCUS with positive high risk HPV testing, HPV 16/18 were negative    Objective   Physical Exam  Exam conducted with a chaperone present.   HENT:      Head: Normocephalic.      Right Ear: External ear normal.      Left Ear: External ear normal.      Nose: Nose normal.      Mouth/Throat:      Mouth: Mucous membranes are moist.   Eyes:      Extraocular Movements: Extraocular movements intact.      Pupils: Pupils are equal, round, and reactive to light.   Cardiovascular:      Rate and Rhythm: Normal rate and regular rhythm.      Heart sounds: Normal heart sounds.   Pulmonary:      Effort: Pulmonary effort is normal.      Breath sounds: Normal breath sounds.   Chest:   Breasts:     Right: Normal.      Left: Normal.   Abdominal:      Palpations: Abdomen is soft.   Genitourinary:     General: Normal vulva.      Labia:         Right: No rash or lesion.         Left: No rash or lesion.       Vagina: Normal.      Cervix: Normal. No cervical motion tenderness.      Uterus: Not deviated, not fixed and not tender.       Adnexa: Right adnexa normal and left adnexa normal.   Musculoskeletal:         General: Normal range of motion.      Cervical back: Neck supple.   Skin:     General: Skin is warm and dry.   Neurological:      General: No focal deficit present.      Mental Status: She is alert and oriented to person, place, and time.   Psychiatric:         Mood and Affect: Mood normal.          Behavior: Behavior normal.         A/P: APE     -  Pap sent     -  Mammogram     -  PCP F/U     -  RTC 1 year     -  Menopause [place] and info Bety Martinez MD 04/07/25 3:52 PM

## 2025-04-07 NOTE — PATIENT INSTRUCTIONS
Thanks for coming in today for your annual GYN exam.     A Pap smear was sent.  Results should be available in the next few weeks.      Arrange to have a mammogram performed once a year.     Follow-up with your PCP and other healthcare specialist as needed.     Feel free to call the office with any problems, questions or concerns prior to your neck scheduled visit.

## 2025-04-11 ENCOUNTER — PHARMACY VISIT (OUTPATIENT)
Dept: PHARMACY | Facility: CLINIC | Age: 60
End: 2025-04-11
Payer: COMMERCIAL

## 2025-04-15 ENCOUNTER — APPOINTMENT (OUTPATIENT)
Dept: CARDIOLOGY | Facility: CLINIC | Age: 60
End: 2025-04-15
Payer: COMMERCIAL

## 2025-04-17 ENCOUNTER — APPOINTMENT (OUTPATIENT)
Dept: OPHTHALMOLOGY | Facility: CLINIC | Age: 60
End: 2025-04-17
Payer: COMMERCIAL

## 2025-04-17 ENCOUNTER — APPOINTMENT (OUTPATIENT)
Dept: CARDIOLOGY | Facility: CLINIC | Age: 60
End: 2025-04-17
Payer: COMMERCIAL

## 2025-04-17 DIAGNOSIS — H40.053 BILATERAL OCULAR HYPERTENSION: Primary | ICD-10-CM

## 2025-04-17 DIAGNOSIS — H40.002 GLAUCOMA SUSPECT OF LEFT EYE: ICD-10-CM

## 2025-04-17 DIAGNOSIS — H40.001 GLAUCOMA SUSPECT OF RIGHT EYE: ICD-10-CM

## 2025-04-17 LAB
CYTOLOGY CMNT CVX/VAG CYTO-IMP: NORMAL
HPV HR 12 DNA GENITAL QL NAA+PROBE: POSITIVE
HPV HR GENOTYPES PNL CVX NAA+PROBE: POSITIVE
HPV16 DNA SPEC QL NAA+PROBE: NEGATIVE
HPV18 DNA SPEC QL NAA+PROBE: NEGATIVE
LAB AP HPV GENOTYPE QUESTION: YES
LAB AP HPV HR: NORMAL
LAB AP PREVIOUS ABNORMAL HISTORY: NORMAL
LABORATORY COMMENT REPORT: NORMAL
PATH REPORT.TOTAL CANCER: NORMAL

## 2025-04-17 PROCEDURE — 92020 GONIOSCOPY: CPT | Performed by: OPHTHALMOLOGY

## 2025-04-17 PROCEDURE — 99213 OFFICE O/P EST LOW 20 MIN: CPT | Performed by: OPHTHALMOLOGY

## 2025-04-17 ASSESSMENT — GONIOSCOPY
OS_INFERIOR: C30F 2+
OD_INFERIOR: C30F 2+
OD_TEMPORAL: C30F 2+
OD_NASAL: C30F 2+
OS_TEMPORAL: C30F 2+
OS_SUPERIOR: C30F 2+
OS_NASAL: C30F 2+
OD_SUPERIOR: C30F 2+

## 2025-04-17 ASSESSMENT — ENCOUNTER SYMPTOMS
PSYCHIATRIC NEGATIVE: 0
NEUROLOGICAL NEGATIVE: 0
GASTROINTESTINAL NEGATIVE: 0
ENDOCRINE NEGATIVE: 0
ALLERGIC/IMMUNOLOGIC NEGATIVE: 0
RESPIRATORY NEGATIVE: 0
EYES NEGATIVE: 1
CONSTITUTIONAL NEGATIVE: 0
CARDIOVASCULAR NEGATIVE: 0
HEMATOLOGIC/LYMPHATIC NEGATIVE: 0
MUSCULOSKELETAL NEGATIVE: 0

## 2025-04-17 ASSESSMENT — PACHYMETRY
OD_CT(UM): 573
OS_CT(UM): 608

## 2025-04-17 ASSESSMENT — TONOMETRY
IOP_METHOD: GOLDMANN APPLANATION
OD_IOP_MMHG: 21
OS_IOP_MMHG: 20

## 2025-04-17 ASSESSMENT — VISUAL ACUITY
OD_CC: 20/20
OS_CC: 20/20
CORRECTION_TYPE: GLASSES
OD_CC+: -2
METHOD: SNELLEN - LINEAR

## 2025-04-17 ASSESSMENT — SLIT LAMP EXAM - LIDS
COMMENTS: NORMAL
COMMENTS: NORMAL

## 2025-04-17 ASSESSMENT — EXTERNAL EXAM - LEFT EYE: OS_EXAM: NORMAL

## 2025-04-17 ASSESSMENT — EXTERNAL EXAM - RIGHT EYE: OD_EXAM: NORMAL

## 2025-04-17 NOTE — PROGRESS NOTES
History    Chief Complaint    Glaucoma Suspect       HPI       Glaucoma Suspect    In both eyes.  Vision is blurred.  Side effects of treatment include none.  Treatment compliance is always.             Comments    59 Year old female presents for glaucoma suspect. Pt denies eye pain. Pt denies any changes in vision. Denies flashes, floaters.           Last edited by Annalisa Michelle on 4/17/2025  3:19 PM.            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 11/24/2021    MRSA carrier    Chronic kidney disease     Chronic kidney disease, stage 4 (severe) (Multi) 01/21/2022    CKD stage G4/A3, GFR 15-29 and albumin creatinine ratio >300 mg/g    Chronic kidney disease, stage 5 (Multi) 04/07/2022    CKD (chronic kidney disease) stage 5, GFR less than 15 ml/min    Cramp and spasm 10/07/2020    Nocturnal muscle cramp    Dependence on renal dialysis (CMS-[facility]) 01/21/2022    Peritoneal dialysis status    Drug induced constipation 03/09/2022    Drug-induced constipation    Encounter for general adult medical examination without abnormal findings 07/25/2017    Well adult exam    Encounter for other preprocedural examination 03/09/2022    Pre-transplant evaluation for kidney transplant    Encounter for other preprocedural examination 03/09/2022    Preop testing    Encounter for other screening for malignant neoplasm of breast 03/09/2022    Breast cancer screening    Encounter for pregnancy test, result negative 03/09/2022     Urine pregnancy test negative    End stage renal disease (Multi) 2022    ESRD on dialysis    Essential (primary) hypertension 10/26/2022    Hypertension    Other acute postprocedural pain 2022    Post-operative pain    Other disorders of phosphorus metabolism 2021    Hyperphosphatemia    Other specified counseling 2018    Grief counseling    Pain in right leg 2019    Pain of right lower extremity    Personal history of other diseases of the digestive system 2022    History of constipation    Personal history of other medical treatment 06/10/2019    History of screening mammography    Shortness of breath 2022    SOB (shortness of breath) on exertion    Unspecified astigmatism, bilateral 2022    Astigmatism of both eyes     Past Surgical History:   Procedure Laterality Date     SECTION, CLASSIC  2018     Section    MOUTH SURGERY  2018    Oral Surgery Tooth Extraction    OTHER SURGICAL HISTORY  2017    Conclusion Of Operation Implants Mesh    OTHER SURGICAL HISTORY  2022    Kidney transplantation    TUBAL LIGATION  2018    Tubal Ligation     SOCIAL HISTORY   SMOKING:  reports that she has never smoked. She has never used smokeless tobacco.  DRUG USE:    reports no history of drug use.    FAMILY HISTORY  family history includes Breast cancer in her maternal grandmother and mother's sister; Diabetes type I in her mother; Glaucoma in her maternal grandfather; Hypertension in her father.    CURRENT MEDICATIONS  No current outpatient medications on file. (Ophthalmology pharm classes)       Current Outpatient Medications (Other)   Medication Sig Dispense Refill    acetaminophen (Tylenol) 325 mg tablet Take by mouth every 6 hours if needed.      amLODIPine (Norvasc) 10 mg tablet TAKE 1 TABLET BY MOUTH EVERY DAY AS DIRECTED 90 tablet 3    aspirin 81 mg EC tablet Take 1 tablet (81 mg) by mouth once daily.      atorvastatin (Lipitor) 40  "mg tablet Take 1 tablet (40 mg) by mouth once daily. 30 tablet 11    carvedilol (Coreg) 6.25 mg tablet TAKE 1 TABLET TWICE DAILY HOLD FOR SBP<110 OR HR <55 180 tablet 3    cyanocobalamin (Vitamin B-12) 500 mcg tablet Take by mouth. (Patient not taking: Reported on 3/14/2025)      magnesium oxide (Mag-Ox) 400 mg (241.3 mg magnesium) tablet Take 1 tablet (400 mg) by mouth 2 times a day. 180 tablet 3    mycophenolate (Cellcept) 250 mg capsule TAKE THREE (3) CAPSULES BY MOUTH EVERY 12 HOURS. 180 capsule 11    pantoprazole (ProtoNix) 40 mg EC tablet Take 1 tablet (40 mg) by mouth once daily.      predniSONE (Deltasone) 5 mg tablet Take 1 tablet (5 mg) by mouth every other day. 45 tablet 3    tacrolimus (Prograf) 0.5 mg capsule Take 1 capsule (0.5 mg) by mouth once daily in the morning. (Patient not taking: Reported on 3/14/2025) 90 capsule 3    tacrolimus (Prograf) 1 mg capsule Take 1 capsule (1 mg) by mouth every 12 hours. 180 capsule 3       Exam   Visual Acuity (Snellen - Linear)         Right Left    Dist cc 20/20 -2 20/20      Correction: Glasses              Edited by: Annalisa Michelle              Not recorded       Not recorded       Not recorded       Intraocular pressure was 21 in the right eye and 20 in the left eye using Goldmann Applanation.  Not recorded       Not recorded       Gonioscopy       Gonioscopy         Right Left    Temporal c30f 2+ c30f 2+    Nasal c30f 2+ c30f 2+    Superior c30f 2+ c30f 2+    Inferior c30f 2+ c30f 2+                   External Exam         Right Left    External Normal Normal              Slit Lamp Exam         Right Left    Lids/Lashes Normal Normal    Conjunctiva/Sclera White and quiet White and quiet    Cornea Clear Clear    Anterior Chamber Deep and quiet Deep and quiet    Iris Round and reactive Round and reactive    Lens 1+nsc 1+nsc    Anterior Vitreous Normal Normal                   <div id=\"MAIN_EXAM_REVIEWED\"></div>       Diagnostics              Plan   -  There were " no encounter diagnoses.  -  Referred By:  No ref. provider found  -  IOP:  Last Tonometry OD 21 / OS 20 /Date 3:23 PM      Target OD: No Value exists for the : EPIC#NHD472 Target OS: No Value exists for the : EPIC#HGL669       Max pressure OD:   Date:         Max Pressure OS:   Date:    -    Gonioscopy       Gonioscopy         Right Left    Temporal c30f 2+ c30f 2+    Nasal c30f 2+ c30f 2+    Superior c30f 2+ c30f 2+    Inferior c30f 2+ c30f 2+                    -    Not recorded       -  Pathophysiology of glaucoma, and potential blinding nature of disease reviewed.      Importance of follow up and compliance stressed  Extensive family hx on mothers side of glaucoma  Otherwise ROS negative   IOP is better  Was able to cooperate with gonioscopy today, angles are open  Will follow more closely     -  RTC 6 months for DFE/OCT RNFL

## 2025-04-18 ENCOUNTER — APPOINTMENT (OUTPATIENT)
Dept: PRIMARY CARE | Facility: CLINIC | Age: 60
End: 2025-04-18
Payer: COMMERCIAL

## 2025-04-22 ENCOUNTER — TELEPHONE (OUTPATIENT)
Dept: CARDIOLOGY | Facility: CLINIC | Age: 60
End: 2025-04-22

## 2025-04-22 NOTE — TELEPHONE ENCOUNTER
Patient called stated that her atorvastatin was giving her stomach aches and frequent bathroom breaks.    Patient was told to hold the atorvastatin for 1 week and see if the symptoms get better / Remain the same. Per provider

## 2025-04-23 LAB
ATRIAL RATE: 76 BPM
P AXIS: 60 DEGREES
P OFFSET: 201 MS
P ONSET: 152 MS
PR INTERVAL: 146 MS
Q ONSET: 225 MS
QRS COUNT: 12 BEATS
QRS DURATION: 66 MS
QT INTERVAL: 364 MS
QTC CALCULATION(BAZETT): 409 MS
QTC FREDERICIA: 393 MS
R AXIS: 2 DEGREES
T AXIS: 18 DEGREES
T OFFSET: 407 MS
VENTRICULAR RATE: 76 BPM

## 2025-04-30 DIAGNOSIS — D84.9 IMMUNOSUPPRESSION: ICD-10-CM

## 2025-04-30 PROCEDURE — RXMED WILLOW AMBULATORY MEDICATION CHARGE

## 2025-05-01 ENCOUNTER — SPECIALTY PHARMACY (OUTPATIENT)
Dept: PHARMACY | Facility: CLINIC | Age: 60
End: 2025-05-01

## 2025-05-01 PROCEDURE — RXMED WILLOW AMBULATORY MEDICATION CHARGE

## 2025-05-01 RX ORDER — MYCOPHENOLATE MOFETIL 250 MG/1
750 CAPSULE ORAL EVERY 12 HOURS
Qty: 180 CAPSULE | Refills: 11 | Status: SHIPPED | OUTPATIENT
Start: 2025-05-01 | End: 2026-05-01

## 2025-05-02 ENCOUNTER — APPOINTMENT (OUTPATIENT)
Dept: OPHTHALMOLOGY | Facility: CLINIC | Age: 60
End: 2025-05-02
Payer: COMMERCIAL

## 2025-05-02 DIAGNOSIS — H52.4 PRESBYOPIA OF BOTH EYES: ICD-10-CM

## 2025-05-02 DIAGNOSIS — H40.053 BILATERAL OCULAR HYPERTENSION: ICD-10-CM

## 2025-05-02 DIAGNOSIS — H52.03 HYPEROPIA OF BOTH EYES: Primary | ICD-10-CM

## 2025-05-02 PROCEDURE — 92012 INTRM OPH EXAM EST PATIENT: CPT | Performed by: OPTOMETRIST

## 2025-05-02 PROCEDURE — 92015 DETERMINE REFRACTIVE STATE: CPT | Performed by: OPTOMETRIST

## 2025-05-02 ASSESSMENT — REFRACTION_MANIFEST
OS_ADD: +2.50
OS_AXIS: 095
OS_CYLINDER: -1.50
OS_CYLINDER: -1.50
OD_SPHERE: +1.25
METHOD_AUTOREFRACTION: 1
OD_AXIS: 080
OD_ADD: +2.50
OS_AXIS: 095
OD_AXIS: 080
OS_SPHERE: +1.50
OD_CYLINDER: -1.25
OD_SPHERE: +1.25
OS_SPHERE: +1.50
OD_CYLINDER: -1.00

## 2025-05-02 ASSESSMENT — ENCOUNTER SYMPTOMS
MUSCULOSKELETAL NEGATIVE: 0
ENDOCRINE NEGATIVE: 0
GASTROINTESTINAL NEGATIVE: 0
EYES NEGATIVE: 1
HEMATOLOGIC/LYMPHATIC NEGATIVE: 0
CONSTITUTIONAL NEGATIVE: 0
ALLERGIC/IMMUNOLOGIC NEGATIVE: 0
CARDIOVASCULAR NEGATIVE: 0
PSYCHIATRIC NEGATIVE: 0
NEUROLOGICAL NEGATIVE: 0
RESPIRATORY NEGATIVE: 0

## 2025-05-02 ASSESSMENT — VISUAL ACUITY
OS_CC: 20/20
OD_CC: 20/20
CORRECTION_TYPE: GLASSES
METHOD: SNELLEN - LINEAR

## 2025-05-02 ASSESSMENT — PACHYMETRY
OD_CT(UM): 573
OS_CT(UM): 608

## 2025-05-02 ASSESSMENT — REFRACTION_WEARINGRX
OS_AXIS: 091
OS_ADD: +2.00
OD_ADD: +2.00
OD_SPHERE: +2.00
OD_AXIS: 076
OS_SPHERE: +2.00
OS_CYLINDER: -1.25
OD_CYLINDER: -1.25

## 2025-05-02 ASSESSMENT — SLIT LAMP EXAM - LIDS
COMMENTS: NORMAL
COMMENTS: NORMAL

## 2025-05-02 ASSESSMENT — EXTERNAL EXAM - LEFT EYE: OS_EXAM: NORMAL

## 2025-05-02 ASSESSMENT — TONOMETRY
OS_IOP_MMHG: 21
OD_IOP_MMHG: 21
IOP_METHOD: GOLDMANN APPLANATION

## 2025-05-02 ASSESSMENT — EXTERNAL EXAM - RIGHT EYE: OD_EXAM: NORMAL

## 2025-05-02 ASSESSMENT — CUP TO DISC RATIO
OD_RATIO: 0.6
OS_RATIO: 0.7

## 2025-05-03 NOTE — PROGRESS NOTES
Assessment/Plan   Diagnoses and all orders for this visit:  Hyperopia of both eyes  Presbyopia of both eyes  A spectacle prescription was dispensed to be used as needed.      Bilateral ocular hypertension  Continue to follow with Dr Koenig

## 2025-05-05 ENCOUNTER — PHARMACY VISIT (OUTPATIENT)
Dept: PHARMACY | Facility: CLINIC | Age: 60
End: 2025-05-05
Payer: COMMERCIAL

## 2025-05-07 ENCOUNTER — APPOINTMENT (OUTPATIENT)
Dept: DERMATOLOGY | Facility: CLINIC | Age: 60
End: 2025-05-07
Payer: COMMERCIAL

## 2025-05-14 ENCOUNTER — LAB (OUTPATIENT)
Dept: LAB | Facility: HOSPITAL | Age: 60
End: 2025-05-14
Payer: COMMERCIAL

## 2025-05-14 DIAGNOSIS — Z94.0 KIDNEY REPLACED BY TRANSPLANT (HHS-HCC): ICD-10-CM

## 2025-05-14 DIAGNOSIS — Z94.0 KIDNEY TRANSPLANT STATUS: Primary | ICD-10-CM

## 2025-05-14 DIAGNOSIS — E55.9 VITAMIN D DEFICIENCY: ICD-10-CM

## 2025-05-14 LAB
25(OH)D3 SERPL-MCNC: 53 NG/ML (ref 30–100)
ALBUMIN SERPL BCP-MCNC: 4.5 G/DL (ref 3.4–5)
ANION GAP SERPL CALC-SCNC: 18 MMOL/L (ref 10–20)
BUN SERPL-MCNC: 13 MG/DL (ref 6–23)
CALCIUM SERPL-MCNC: 10.1 MG/DL (ref 8.6–10.6)
CHLORIDE SERPL-SCNC: 105 MMOL/L (ref 98–107)
CO2 SERPL-SCNC: 22 MMOL/L (ref 21–32)
CREAT SERPL-MCNC: 0.74 MG/DL (ref 0.5–1.05)
CREAT UR-MCNC: 86.5 MG/DL (ref 20–320)
EGFRCR SERPLBLD CKD-EPI 2021: >90 ML/MIN/1.73M*2
ERYTHROCYTE [DISTWIDTH] IN BLOOD BY AUTOMATED COUNT: 13.6 % (ref 11.5–14.5)
GLUCOSE SERPL-MCNC: 110 MG/DL (ref 74–99)
HCT VFR BLD AUTO: 40.6 % (ref 36–46)
HGB BLD-MCNC: 12.1 G/DL (ref 12–16)
MAGNESIUM SERPL-MCNC: 1.69 MG/DL (ref 1.6–2.4)
MCH RBC QN AUTO: 26.5 PG (ref 26–34)
MCHC RBC AUTO-ENTMCNC: 29.8 G/DL (ref 32–36)
MCV RBC AUTO: 89 FL (ref 80–100)
NRBC BLD-RTO: 0 /100 WBCS (ref 0–0)
PHOSPHATE SERPL-MCNC: 4 MG/DL (ref 2.5–4.9)
PLATELET # BLD AUTO: 535 X10*3/UL (ref 150–450)
POTASSIUM SERPL-SCNC: 4.6 MMOL/L (ref 3.5–5.3)
PROT UR-ACNC: 7 MG/DL (ref 5–24)
PROT/CREAT UR: 0.08 MG/MG CREAT (ref 0–0.17)
PTH-INTACT SERPL-MCNC: 87.5 PG/ML (ref 18.5–88)
RBC # BLD AUTO: 4.57 X10*6/UL (ref 4–5.2)
SODIUM SERPL-SCNC: 140 MMOL/L (ref 136–145)
TACROLIMUS BLD-MCNC: 17.8 NG/ML
WBC # BLD AUTO: 9.7 X10*3/UL (ref 4.4–11.3)

## 2025-05-14 PROCEDURE — 80197 ASSAY OF TACROLIMUS: CPT

## 2025-05-14 PROCEDURE — 83970 ASSAY OF PARATHORMONE: CPT

## 2025-05-14 PROCEDURE — 82570 ASSAY OF URINE CREATININE: CPT

## 2025-05-14 PROCEDURE — 82306 VITAMIN D 25 HYDROXY: CPT

## 2025-05-14 PROCEDURE — 80069 RENAL FUNCTION PANEL: CPT

## 2025-05-14 PROCEDURE — 85027 COMPLETE CBC AUTOMATED: CPT

## 2025-05-14 PROCEDURE — 36415 COLL VENOUS BLD VENIPUNCTURE: CPT

## 2025-05-14 PROCEDURE — 84156 ASSAY OF PROTEIN URINE: CPT

## 2025-05-14 PROCEDURE — 83735 ASSAY OF MAGNESIUM: CPT

## 2025-05-15 ENCOUNTER — TELEPHONE (OUTPATIENT)
Facility: HOSPITAL | Age: 60
End: 2025-05-15
Payer: COMMERCIAL

## 2025-05-15 DIAGNOSIS — Z94.0 KIDNEY REPLACED BY TRANSPLANT (HHS-HCC): ICD-10-CM

## 2025-05-15 NOTE — TELEPHONE ENCOUNTER
Patient called requesting to speak with coordinator about high tac level. Tac level has increased from 6.4 to 17.5 in two months.  Patient call back number is [phone]

## 2025-05-16 NOTE — TELEPHONE ENCOUNTER
Call returned to patient, tac level drawn at 0730 level of 17.5  Couple days of diarrhea last week, last day Friday.   States takes meds at 830 BID and did not take meds prior to lab draw.   Patient taking 1 mg BID.     Will review with doctor and call patient back.

## 2025-05-16 NOTE — TELEPHONE ENCOUNTER
Call placed to the patient and she confirmed that she did hold her Tacrolimus prior to having labs completed. Reviewed with Dr. Smith and patient will hold tacrolimus dose for 1 day and repeat labs. Patient states understanding and denies any needs at this time. Encouraged her to contact TI if any concerns arise, and she agrees to do so.

## 2025-05-17 ENCOUNTER — LAB (OUTPATIENT)
Dept: LAB | Facility: HOSPITAL | Age: 60
End: 2025-05-17
Payer: COMMERCIAL

## 2025-05-17 DIAGNOSIS — Z94.0 KIDNEY TRANSPLANT STATUS: ICD-10-CM

## 2025-05-17 DIAGNOSIS — Z95.0 PRESENCE OF CARDIAC PACEMAKER: Primary | ICD-10-CM

## 2025-05-17 LAB
ALBUMIN SERPL BCP-MCNC: 4.4 G/DL (ref 3.4–5)
ANION GAP SERPL CALC-SCNC: 15 MMOL/L (ref 10–20)
BUN SERPL-MCNC: 11 MG/DL (ref 6–23)
CALCIUM SERPL-MCNC: 10.1 MG/DL (ref 8.6–10.3)
CHLORIDE SERPL-SCNC: 107 MMOL/L (ref 98–107)
CO2 SERPL-SCNC: 22 MMOL/L (ref 21–32)
CREAT SERPL-MCNC: 0.78 MG/DL (ref 0.5–1.05)
EGFRCR SERPLBLD CKD-EPI 2021: 89 ML/MIN/1.73M*2
GLUCOSE SERPL-MCNC: 93 MG/DL (ref 74–99)
PHOSPHATE SERPL-MCNC: 4.1 MG/DL (ref 2.5–4.9)
POTASSIUM SERPL-SCNC: 4.7 MMOL/L (ref 3.5–5.3)
SODIUM SERPL-SCNC: 139 MMOL/L (ref 136–145)

## 2025-05-17 PROCEDURE — 80197 ASSAY OF TACROLIMUS: CPT

## 2025-05-17 PROCEDURE — 80069 RENAL FUNCTION PANEL: CPT

## 2025-05-17 PROCEDURE — 36415 COLL VENOUS BLD VENIPUNCTURE: CPT

## 2025-05-18 LAB — TACROLIMUS BLD-MCNC: 9.5 NG/ML

## 2025-05-19 ENCOUNTER — TELEPHONE (OUTPATIENT)
Facility: HOSPITAL | Age: 60
End: 2025-05-19
Payer: COMMERCIAL

## 2025-05-19 DIAGNOSIS — Z94.0 KIDNEY REPLACED BY TRANSPLANT (HHS-HCC): ICD-10-CM

## 2025-05-19 NOTE — TELEPHONE ENCOUNTER
Labs reviewed with Dr Dewey    tac 9.5(17th), 17.8(14th), 6.4, 7.0, 6.7  cr 0.78  tac 1/1  had randirea 17.8, held a dose and restarted tac 1 bid    PLAN: repeat tac later this week    Messaged pt with plan. Orders placed

## 2025-05-23 ENCOUNTER — ANCILLARY PROCEDURE (OUTPATIENT)
Dept: CARDIOLOGY | Facility: CLINIC | Age: 60
End: 2025-05-23
Payer: COMMERCIAL

## 2025-05-23 DIAGNOSIS — Z94.0 KIDNEY REPLACED BY TRANSPLANT (HHS-HCC): ICD-10-CM

## 2025-05-23 DIAGNOSIS — I10 ESSENTIAL HYPERTENSION: ICD-10-CM

## 2025-05-23 PROCEDURE — 93306 TTE W/DOPPLER COMPLETE: CPT

## 2025-05-23 PROCEDURE — 93306 TTE W/DOPPLER COMPLETE: CPT | Performed by: INTERNAL MEDICINE

## 2025-05-24 PROCEDURE — 80061 LIPID PANEL: CPT

## 2025-05-24 PROCEDURE — 36415 COLL VENOUS BLD VENIPUNCTURE: CPT

## 2025-05-24 PROCEDURE — 82172 ASSAY OF APOLIPOPROTEIN: CPT

## 2025-05-24 PROCEDURE — 80197 ASSAY OF TACROLIMUS: CPT

## 2025-05-27 ENCOUNTER — LAB (OUTPATIENT)
Dept: LAB | Facility: HOSPITAL | Age: 60
End: 2025-05-27
Payer: COMMERCIAL

## 2025-05-27 ENCOUNTER — SPECIALTY PHARMACY (OUTPATIENT)
Dept: PHARMACY | Facility: CLINIC | Age: 60
End: 2025-05-27

## 2025-05-27 DIAGNOSIS — Z94.0 KIDNEY TRANSPLANT STATUS: Primary | ICD-10-CM

## 2025-05-27 LAB
AORTIC VALVE MEAN GRADIENT: 4 MMHG
AORTIC VALVE PEAK VELOCITY: 1.34 M/S
AV PEAK GRADIENT: 7 MMHG
AVA (PEAK VEL): 2.14 CM2
AVA (VTI): 2.16 CM2
CHOLEST SERPL-MCNC: 86 MG/DL (ref 0–199)
CHOLESTEROL/HDL RATIO: 2.4
EJECTION FRACTION APICAL 4 CHAMBER: 57.8
EJECTION FRACTION: 56 %
HDLC SERPL-MCNC: 36.5 MG/DL
LDLC SERPL CALC-MCNC: 35 MG/DL
LEFT ATRIUM VOLUME AREA LENGTH INDEX BSA: 18.2 ML/M2
LEFT VENTRICLE INTERNAL DIMENSION DIASTOLE: 3.64 CM (ref 3.5–6)
LEFT VENTRICULAR OUTFLOW TRACT DIAMETER: 1.85 CM
MITRAL VALVE E/A RATIO: 0.41
NON HDL CHOLESTEROL: 50 MG/DL (ref 0–149)
RIGHT VENTRICLE FREE WALL PEAK S': 12 CM/S
TACROLIMUS BLD-MCNC: 14.5 NG/ML
TRICUSPID ANNULAR PLANE SYSTOLIC EXCURSION: 2.1 CM
TRIGL SERPL-MCNC: 74 MG/DL (ref 0–149)
VLDL: 15 MG/DL (ref 0–40)

## 2025-05-28 ENCOUNTER — TELEPHONE (OUTPATIENT)
Facility: HOSPITAL | Age: 60
End: 2025-05-28
Payer: COMMERCIAL

## 2025-05-28 DIAGNOSIS — Z94.0 KIDNEY REPLACED BY TRANSPLANT (HHS-HCC): ICD-10-CM

## 2025-05-28 LAB — LPA SERPL-MCNC: 8 MG/DL

## 2025-05-28 RX ORDER — TACROLIMUS 1 MG/1
1 CAPSULE ORAL DAILY
Qty: 90 CAPSULE | Refills: 3 | Status: SHIPPED | OUTPATIENT
Start: 2025-05-28 | End: 2026-05-28

## 2025-05-28 RX ORDER — TACROLIMUS 0.5 MG/1
0.5 CAPSULE ORAL NIGHTLY
Qty: 90 CAPSULE | Refills: 3 | Status: SHIPPED | OUTPATIENT
Start: 2025-05-28 | End: 2026-05-28

## 2025-05-28 NOTE — TELEPHONE ENCOUNTER
Labs reviewed with dr stubbs     tac 14.5, 9.5, 17.8 (loose stools held dose), 6.4, 7.0  cr 0.78  tac 1 bid    PLAN: decrease tac 1/0.5  Pt has 0.5 tac at home  Messaged with plan

## 2025-06-02 ENCOUNTER — OFFICE VISIT (OUTPATIENT)
Dept: CARDIOLOGY | Facility: CLINIC | Age: 60
End: 2025-06-02
Payer: COMMERCIAL

## 2025-06-02 VITALS
OXYGEN SATURATION: 98 % | HEART RATE: 72 BPM | WEIGHT: 149 LBS | HEIGHT: 59 IN | DIASTOLIC BLOOD PRESSURE: 87 MMHG | SYSTOLIC BLOOD PRESSURE: 142 MMHG | BODY MASS INDEX: 30.04 KG/M2

## 2025-06-02 DIAGNOSIS — E78.5 DYSLIPIDEMIA: ICD-10-CM

## 2025-06-02 DIAGNOSIS — I10 ESSENTIAL HYPERTENSION: Primary | ICD-10-CM

## 2025-06-02 DIAGNOSIS — Z94.0 KIDNEY REPLACED BY TRANSPLANT (HHS-HCC): ICD-10-CM

## 2025-06-02 DIAGNOSIS — I10 ESSENTIAL (PRIMARY) HYPERTENSION: ICD-10-CM

## 2025-06-02 LAB
ATRIAL RATE: 72 BPM
P AXIS: 2 DEGREES
P OFFSET: 196 MS
P ONSET: 137 MS
PR INTERVAL: 170 MS
Q ONSET: 222 MS
QRS COUNT: 12 BEATS
QRS DURATION: 70 MS
QT INTERVAL: 362 MS
QTC CALCULATION(BAZETT): 396 MS
QTC FREDERICIA: 384 MS
R AXIS: 2 DEGREES
T AXIS: 16 DEGREES
T OFFSET: 403 MS
VENTRICULAR RATE: 72 BPM

## 2025-06-02 PROCEDURE — 99214 OFFICE O/P EST MOD 30 MIN: CPT | Performed by: INTERNAL MEDICINE

## 2025-06-02 PROCEDURE — 3079F DIAST BP 80-89 MM HG: CPT | Performed by: INTERNAL MEDICINE

## 2025-06-02 PROCEDURE — 1036F TOBACCO NON-USER: CPT | Performed by: INTERNAL MEDICINE

## 2025-06-02 PROCEDURE — 93005 ELECTROCARDIOGRAM TRACING: CPT | Performed by: INTERNAL MEDICINE

## 2025-06-02 PROCEDURE — 3077F SYST BP >= 140 MM HG: CPT | Performed by: INTERNAL MEDICINE

## 2025-06-02 PROCEDURE — 3008F BODY MASS INDEX DOCD: CPT | Performed by: INTERNAL MEDICINE

## 2025-06-02 RX ORDER — ATORVASTATIN CALCIUM 40 MG/1
40 TABLET, FILM COATED ORAL DAILY
Qty: 30 TABLET | Refills: 11 | Status: SHIPPED | OUTPATIENT
Start: 2025-06-02 | End: 2026-06-02

## 2025-06-02 RX ORDER — AMLODIPINE BESYLATE 10 MG/1
10 TABLET ORAL DAILY
Qty: 90 TABLET | Refills: 3 | Status: SHIPPED | OUTPATIENT
Start: 2025-06-02

## 2025-06-02 RX ORDER — CARVEDILOL 6.25 MG/1
6.25 TABLET ORAL 2 TIMES DAILY
Qty: 180 TABLET | Refills: 3 | Status: SHIPPED | OUTPATIENT
Start: 2025-06-02 | End: 2026-06-02

## 2025-06-02 ASSESSMENT — PAIN SCALES - GENERAL: PAINLEVEL_OUTOF10: 0-NO PAIN

## 2025-06-02 ASSESSMENT — ENCOUNTER SYMPTOMS: OCCASIONAL FEELINGS OF UNSTEADINESS: 0

## 2025-06-02 NOTE — PROGRESS NOTES
Referred by No ref. provider found for Follow-up (Echo results)     HPI:    Farzana De La Cruz is a 59 y.o. 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), Chronic kidney disease, Chronic kidney disease, stage 4 (severe) (Multi) (01/21/2022), Chronic kidney disease, stage 5 (Multi) (04/07/2022), Cramp and spasm (10/07/2020), Dependence on renal dialysis (01/21/2022), Drug induced constipation (03/09/2022), Encounter for general adult medical examination without abnormal findings (07/25/2017), Encounter for other preprocedural examination (03/09/2022), Encounter for other preprocedural examination (03/09/2022), Encounter for other screening for malignant neoplasm of breast (03/09/2022), Encounter for pregnancy test, result negative (03/09/2022), End stage renal disease (Multi) (01/21/2022), Essential (primary) hypertension (10/26/2022), Other acute postprocedural pain (03/09/2022), Other disorders of phosphorus metabolism (06/28/2021), Other specified counseling (12/05/2018), Pain in right leg (06/25/2019), Personal history of other diseases of the digestive system (01/12/2022), Personal history of other medical treatment (06/10/2019), Shortness of breath (04/21/2022), and Unspecified astigmatism, bilateral (09/02/2022).    She has no past medical history of Personal history of irradiation. who presents to cardiology clinic to establish care.     3/14/2025--> She presents to establish care.  She is a recipient of a live donor kidney transplant for end-stage renal disease.  As part of workup, she has  "been followed by the transplant team, but has not had a cardiologist.  She has been prompted multiple times that she should have 1 due to increased risk factors for coronary artery disease.  She has a longstanding history of dyslipidemia.  She has however not been initiated on statin therapy.      At baseline, she reports that she is reemerging from her winter hibernation of activities.  She is starting to walk more, has taken more stairs.  She notes mild shortness of breath that she attributes to being out of shape, but is keeping an eye on this.  She is interested in other lifestyle changes that she can of active to help address about her lifestyle and is interested in online exercise modalities.    6/2/2025 --> She returns for follow up. She has overall been well. She reports that she has been compliant with all medications.  She has been taking her atorvastatin at night daily.  She has been doing her exercises with \"fabulous 50s\" and notes that she feels great about this.  She started to tell other individuals about this workout.  She has been down almost 10 pounds.  She offers no cardiovascular complaints nor concerns at this time.      No exacerbating or relieving factors.  Patient denies chest pain and angina.  Pt denies orthopnea, and paroxysmal nocturnal dyspnea.  Pt denies worsening lower extremity edema.  Pt denies palpitations or syncope.  No recent falls.  No fever or chills.  No cough.  No change in bowel or bladder habits.  No sick contacts.  No recent travel.    12 point review of systems including (Constitutional, Eyes, ENMT, Respiratory, Cardiac, Gastrointestinal, Neurological, Psychiatric, and Hematologic) was performed and is otherwise negative.    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 (2021), Chronic kidney disease, Chronic kidney disease, stage 4 (severe) (Multi) (2022), Chronic kidney disease, stage 5 (Multi) (2022), Cramp and spasm (10/07/2020), Dependence on renal dialysis (2022), Drug induced constipation (2022), Encounter for general adult medical examination without abnormal findings (2017), Encounter for other preprocedural examination (2022), Encounter for other preprocedural examination (2022), Encounter for other screening for malignant neoplasm of breast (2022), Encounter for pregnancy test, result negative (2022), End stage renal disease (Multi) (2022), Essential (primary) hypertension (10/26/2022), Other acute postprocedural pain (2022), Other disorders of phosphorus metabolism (2021), Other specified counseling (2018), Pain in right leg (2019), Personal history of other diseases of the digestive system (2022), Personal history of other medical treatment (06/10/2019), Shortness of breath (2022), and Unspecified astigmatism, bilateral (2022).    She has no past medical history of Personal history of irradiation.    Past surgical history reviewed:   has a past surgical history that includes Other surgical history (2017); Other surgical history (2022);  section, classic (2018); Tubal ligation (2018); and Mouth surgery (2018).    Social history reviewed:   reports that she has never smoked. She has never used smokeless tobacco. She reports that she does not drink alcohol and does not use drugs.     Family history reviewed:    Family History   Problem Relation Name Age of Onset    Breast cancer Maternal Grandmother      Glaucoma Maternal  "Grandfather      Breast cancer Mother's Sister      Diabetes type I Mother Opal Pulido     Hypertension Father Maykel De La Cruz        Allergies reviewed: Egg, Latex, and Senna     Medications reviewed:   Current Outpatient Medications   Medication Instructions    acetaminophen (Tylenol) 325 mg tablet Every 6 hours PRN    amLODIPine (NORVASC) 10 mg, oral, Daily, as directed    aspirin 81 mg EC tablet 1 tablet, Daily    atorvastatin (LIPITOR) 40 mg, oral, Daily    carvedilol (Coreg) 6.25 mg tablet TAKE 1 TABLET TWICE DAILY HOLD FOR SBP<110 OR HR <55    cyanocobalamin (Vitamin B-12) 500 mcg tablet Take by mouth.    magnesium oxide (MAG-OX) 400 mg, oral, 2 times daily    mycophenolate (Cellcept) 250 mg capsule TAKE THREE (3) CAPSULES BY MOUTH EVERY 12 HOURS.    pantoprazole (ProtoNix) 40 mg EC tablet 1 tablet, Daily    predniSONE (DELTASONE) 5 mg, oral, Every other day    tacrolimus (PROGRAF) 0.5 mg, oral, Nightly    tacrolimus (PROGRAF) 1 mg, oral, Daily        Vitals reviewed: Visit Vitals  /87 (BP Location: Right arm, Patient Position: Sitting)   Pulse 72       Physical Exam:   /87 (BP Location: Right arm, Patient Position: Sitting)   Pulse 72   Ht 1.499 m (4' 11\")   Wt 67.6 kg (149 lb)   SpO2 98%   BMI 30.09 kg/m²   General:  Patient is awake, alert, and oriented.  Patient is in no acute distress.  HEENT:  Pupils equal and reactive.  Normocephalic.  Moist mucosa.    Neck:  No thyromegaly.  Normal Jugular Venous Pressure.  Cardiovascular:  Regular rate and rhythm.  Normal S1 and S2.  1/6 DOMENICA.  Pulmonary:  Clear to auscultation bilaterally.  Abdomen:  Soft. Non-tender.   Non-distended.  Positive bowel sounds.  Lower Extremities:  2+ pedal pulses. No LE edema.  Neurologic:  Cranial nerves intact.  No focal deficit.   Skin: Skin warm and dry, normal skin turgor.   Psychiatric: Normal affect.    Last Labs:  CBC -      Lab Results   Component Value Date    WBC 9.7 05/14/2025    HGB 12.1 05/14/2025    HCT " "40.6 05/14/2025     (H) 05/14/2025        CMP-  Lab Results   Component Value Date    GLUCOSE 93 05/17/2025     05/17/2025    K 4.7 05/17/2025     05/17/2025    CO2 22 05/17/2025    ANIONGAP 15 05/17/2025    BUN 11 05/17/2025    CREATININE 0.78 05/17/2025    EGFR 89 05/17/2025    CALCIUM 10.1 05/17/2025    PHOS 4.1 05/17/2025    PROT 7.7 12/05/2022    ALBUMIN 4.4 05/17/2025    AST 16 12/05/2022    ALT 16 12/05/2022    ALKPHOS 74 12/05/2022    BILITOT 0.3 12/05/2022        LIPIDS-  Lab Results   Component Value Date    CHOL 86 05/24/2025    TRIG 74 05/24/2025    HDL 36.5 05/24/2025    CHHDL 2.4 05/24/2025    VLDL 15 05/24/2025        OTHERS-  Lab Results   Component Value Date    HGBA1C 6.3 (H) 12/09/2024        I personally reviewed the patient's recent vitals, labs, medications, orders, EKGs, pertinent cardiac imaging/ echocardiography and ischemic evaluations including stress testing/ cardiac catheterization.    Assessment and Plan:  Problem List Items Addressed This Visit       Dyslipidemia    Overview   The ASCVD Risk score (Jenaro SEYMOUR, et al., 2019) failed to calculate for the following reasons:    The valid total cholesterol range is 130 to 320 mg/dL\    Lab Results   Component Value Date    CHOL 86 05/24/2025    CHOL 203 (H) 12/09/2024    CHOL 192 02/29/2024     Lab Results   Component Value Date    HDL 36.5 05/24/2025    HDL 74.8 12/09/2024    HDL 74.6 02/29/2024     Lab Results   Component Value Date    LDLCALC 35 05/24/2025    LDLCALC 95 02/29/2024     Lab Results   Component Value Date    TRIG 74 05/24/2025    TRIG 111 02/29/2024    TRIG 149 06/02/2022     No components found for: \"CHOLHDL\"           Current Assessment & Plan   Initiate atorvastatin 40 mg  -- EXCELLENT RESPONSE !!!           Essential hypertension - Primary    Overview   Formatting of this note might be different from the original. mild Creatinine clearance from [facility]: 64 ml/min GFR from MDRD: greater than 60 " weight 64.8 kg, age 43 yr, cr=1.19 on 10/29/2009, race African American         Kidney replaced by transplant ([facility])    Overview   living donor kidney transplant on 3/8/2022. Patient's and donor EBV status is D negative/R positive and CMV status D-/R+.   Allosure last check was 4/2024 - 0.15%   Tacrolimus to 1mg AM, 1mg PM  ( 02/2025 )    mg BID ( 02/2025)   Prednisone to 5mg every other day ( 02/2025)           Other Visit Diagnoses         Essential (primary) hypertension                  Please followup with me in Cardiology clinic within the next 9 months  Please return to clinic sooner or seek emergent care if your symptoms reoccur or worsen.    Thank you for allowing me to participate in their care.  Please feel free to call me with any further questions or concerns.        Jeremie Paniagua DO   Division of Cardiovascular Medicine  [facility]

## 2025-06-02 NOTE — PATIENT INSTRUCTIONS
"It was a pleasure seeing you today. I would like to see you back in clinic in  9  months. You can call my office if questions arise between now and our next visit.     Today, we talked about your cholesterol levels  -- Continue on Atorvastatin at 40 mg Po Daily   --Please obtain cholesterol levels about 1 week before next appointment.     We will perform an echocardiogram roughly late May to reassess your heart function in the setting of end-stage renal disease status post transplant as well as essential hypertension.        [id number]  --> exercise with Mindy       Can I lower my cholesterol by changing my diet?   Maybe. Some people can lower their cholesterol by changing their diet. But this does not always work. Still, you can improve your overall health by eating better.  If you have high cholesterol, it might help to avoid or limit saturated fats. These are found in foods like:  ?Red meat  ?Butter  ?Fried foods  ?Cheese  ?Baked goods, such as cookies, cakes, or brownies  Other things that might help lower cholesterol include:  ?Eating more soluble fiber - Soluble fiber is found in fruits, oats, barley, beans, and peas.  ?A vegetarian or vegan diet - A vegetarian diet contains no meat. A vegan diet contains no animal products at all, including meat, eggs, or milk.  ?Replacing meat with soy sometimes - Soy-based products include tofu and tempeh.  In general, you can improve your health by eating lots of fruits, vegetables, and whole grains. You can also cut back on carbohydrates, sweets, and processed foods.  Eating a \"Mediterranean diet\" might help lower your cholesterol. This type of diet:  ?Includes a lot of fruits, vegetables, nuts, and whole grains  ?Uses olive oil instead of other fats  ?Includes some fish, poultry, and dairy products, but not a lot of red meat  What about eggs?   Eggs are OK if you want to eat them, but don't overdo it. The news often has stories about the health benefits or risks of " "eggs. The truth is, eggs are a good source of protein and do not raise cholesterol much. Saturated fats raise cholesterol levels more than eggs do.  Are there specific foods that can lower my cholesterol?   Maybe. There are some foods that seem to help lower cholesterol, including:  ?Foods rich in omega-3 fatty acids - Studies show that people who eat lots of these foods are less likely to have heart disease than those who eat less of them. Examples include oily fish (such as salmon, herring, or tuna), olive oil, and canola oil. It's fine to eat 1 to 2 servings of oily fish a week.  ?Nuts - Some studies show that eating certain nuts can help lower cholesterol. They might even lower the risk of heart attack or death. These nuts include walnuts, almonds, and pistachios.  ?Fiber-rich foods - These foods seem to lower cholesterol and are generally good for your health. Examples include fruits, vegetables, beans, and oats. Some doctors even recommend taking fiber supplements.  What about  foods that claim to lower cholesterol?   Be careful with these foods. There are now many foods that have added plant extracts called \"sterols\" or \"stanols.\" Examples include special margarines such as Benecol. Foods with added sterols or stanols can lower cholesterol. But it's not clear whether they help lower the risk of heart attack or stroke, or if they are safe to use long-term. Plus, research in animals shows that these extracts might actually cause health problems. Experts think more research is needed before they can recommend that people eat these foods.  Should I take supplements to lower my cholesterol?   Maybe. Some research has shown that certain supplements can lower cholesterol. But there is almost no research showing that supplements can help prevent heart attacks, strokes, or any of the problems caused by high cholesterol. If you decide to try supplements, keep in mind that in the [place], the government does not " "regulate supplements very well. That means that what's on a supplement's label is not always actually in the bottle.  Here are some supplements that might help with cholesterol:  ?Red yeast rice - Red yeast rice helps lower cholesterol. It might contain monacolin K, which is the same ingredient that is in a prescription medicine to lower cholesterol. But the supplements that you can buy might not always have much monacolin K. If you are interested in taking red yeast rice, talk to your doctor to see if the prescription medicine is a better choice.  ?Omega-3 fatty acid supplements - Some omega-3 fatty acid supplements might help lower cholesterol, but they might also raise it.  What supplements don't work?   There is no good evidence that calcium, garlic, coconut oil, coconut water, resveratrol, policosanol, or soy isoflavone supplements lower cholesterol.    Below are some Heart Health Tips that we provide to all of our patients. I hope you fing them useful.     - If you are having problems with medications, consider looking at the following websites.   --  \"[employer]\"   --  \"[facility]\"      - We are happy to supply written prescriptions if needed to allow you to obtain your medications from different pharmacies. Additionally, if you are having issues with mail order delivery, please let us know. We can send a limited supply of your medications to your local pharmacy.     -  We recommend you follow a heart healthy diet. Watch food labels and try not to eat more than 2,500 mg of sodium per day. Avoid foods high in salt like processed meats (lunch meats, cassidy, and sausage), processed foods (boxed dinners, canned soups), fried and fast foods. Monitor serving sizes and if the sodium per serving size is more than 200 mg, avoid those foods. If the sodium per serving size is between 100-200 mg, you can use those in limited quantities. Try to choose foods where the amount of sodium per serving size is " less than 100 mg. Try to eat a diet rich in fruits and vegetables, whole grains, low fat dairy products, skinless poultry and fish, nuts, beans, non-tropical vegetable oils. Limit saturated fat, trans fat, sodium, red meats, and sugar-sweetened beverages.   Limit alcohol     -The combination of a reduced-calorie diet and increased physical activity is recommended. Adults should aim to get at least 150 minutes of moderate physical activity per week (30 minutes of moderate physical activities at least 5 days per week). Examples of moderate physical activities include brisk walking, swimming, aerobic dancing, heavy gardening, jumping rope, bicycling 10 MPH or faster, tennis, hiking uphill or with a heavy backpack. Please let us know if you would like to learn more about your nutrition and calories and additional options including weight loss programs to help you reach your goal.     -If you smoke, stop smoking. If you stop smoking you can help get rid of a major source of stress to your heart. Smoking makes your heart rate and blood pressure go up and increases your risk or developing cardiovascular diseases and worsen symptoms associated with heart failure.     -Obtain a BP monitor and monitor your BP daily. Check it around the same time each day; at least 1 hour after taking your medications. Record your BP in a log and bring your log with you to your doctors appointment.     -F/u with your PCP as recommended.

## 2025-06-11 ENCOUNTER — TELEPHONE (OUTPATIENT)
Dept: OBSTETRICS AND GYNECOLOGY | Facility: CLINIC | Age: 60
End: 2025-06-11
Payer: COMMERCIAL

## 2025-06-11 NOTE — TELEPHONE ENCOUNTER
Pt verified by name and .  Pt is aware of Dr. Sol's message:  Paps reviewed again and LGSIL with +HR HPV, thus  repeat colpo recommended.   Nurse scheduled pt for 2025 at 11:00 AM.  Pt has no questions at this time.

## 2025-06-12 ENCOUNTER — SPECIALTY PHARMACY (OUTPATIENT)
Dept: PHARMACY | Facility: CLINIC | Age: 60
End: 2025-06-12

## 2025-06-12 PROCEDURE — RXMED WILLOW AMBULATORY MEDICATION CHARGE

## 2025-06-13 ENCOUNTER — PHARMACY VISIT (OUTPATIENT)
Dept: PHARMACY | Facility: CLINIC | Age: 60
End: 2025-06-13
Payer: COMMERCIAL

## 2025-07-08 PROCEDURE — RXMED WILLOW AMBULATORY MEDICATION CHARGE

## 2025-07-10 ENCOUNTER — PHARMACY VISIT (OUTPATIENT)
Dept: PHARMACY | Facility: CLINIC | Age: 60
End: 2025-07-10
Payer: COMMERCIAL

## 2025-07-25 ENCOUNTER — APPOINTMENT (OUTPATIENT)
Dept: OBSTETRICS AND GYNECOLOGY | Facility: CLINIC | Age: 60
End: 2025-07-25
Payer: COMMERCIAL

## 2025-07-25 VITALS
HEIGHT: 59 IN | DIASTOLIC BLOOD PRESSURE: 72 MMHG | BODY MASS INDEX: 30.24 KG/M2 | SYSTOLIC BLOOD PRESSURE: 120 MMHG | WEIGHT: 150 LBS

## 2025-07-25 DIAGNOSIS — R87.619 ABNORMAL CERVICAL PAPANICOLAOU SMEAR, UNSPECIFIED ABNORMAL PAP FINDING: Primary | ICD-10-CM

## 2025-07-25 PROCEDURE — 99213 OFFICE O/P EST LOW 20 MIN: CPT | Performed by: OBSTETRICS & GYNECOLOGY

## 2025-07-25 ASSESSMENT — ENCOUNTER SYMPTOMS
MUSCULOSKELETAL NEGATIVE: 0
HEMATOLOGIC/LYMPHATIC NEGATIVE: 0
EYES NEGATIVE: 0
ENDOCRINE NEGATIVE: 0
ALLERGIC/IMMUNOLOGIC NEGATIVE: 0
NEUROLOGICAL NEGATIVE: 0
CONSTITUTIONAL NEGATIVE: 0
RESPIRATORY NEGATIVE: 0
GASTROINTESTINAL NEGATIVE: 0
PSYCHIATRIC NEGATIVE: 0
CARDIOVASCULAR NEGATIVE: 0

## 2025-07-25 ASSESSMENT — PAIN SCALES - GENERAL: PAINLEVEL_OUTOF10: 0-NO PAIN

## 2025-07-25 NOTE — PROGRESS NOTES
59-year-old obese  presents colposcopy.  She has a history of abnormal Pap smears.    -  LGSIL w/+HR HPV     - LGSIL w/positive HR HPV    - LGSIL with negative high risk HPV testing    - colposcopy negative    - ASCUS with negative high risk HPV testing    - negative Pap negative HPV testing    - colposcopy negative    - ASCUS with positive high risk HPV testing, HPV 16/18 were negative    D/W the patient her  Pap and history. D/W the patient her immunocompromised status is likely effecting her ability to clear her Pap. Patient elects for repeat Pap in 6 months and possible repeat colpo at a later date or if her F/U Pap is worse.     O: WDWN woman in NAD, A&O x3    A/P: abnormal Pap w/history of stable Paps with negative colpo    -  RTC 6 months for repeat Pap

## 2025-07-28 ENCOUNTER — APPOINTMENT (OUTPATIENT)
Dept: PRIMARY CARE | Facility: CLINIC | Age: 60
End: 2025-07-28
Payer: COMMERCIAL

## 2025-08-09 ENCOUNTER — LAB (OUTPATIENT)
Dept: LAB | Facility: HOSPITAL | Age: 60
End: 2025-08-09
Payer: COMMERCIAL

## 2025-08-09 DIAGNOSIS — Z94.0 KIDNEY REPLACED BY TRANSPLANT (HHS-HCC): Primary | ICD-10-CM

## 2025-08-09 LAB
ALBUMIN SERPL BCP-MCNC: 4.3 G/DL (ref 3.4–5)
ANION GAP SERPL CALC-SCNC: 12 MMOL/L (ref 10–20)
BUN SERPL-MCNC: 13 MG/DL (ref 6–23)
CALCIUM SERPL-MCNC: 9.7 MG/DL (ref 8.6–10.6)
CHLORIDE SERPL-SCNC: 105 MMOL/L (ref 98–107)
CO2 SERPL-SCNC: 26 MMOL/L (ref 21–32)
CREAT SERPL-MCNC: 0.83 MG/DL (ref 0.5–1.05)
CREAT UR-MCNC: 124.1 MG/DL (ref 20–320)
EGFRCR SERPLBLD CKD-EPI 2021: 82 ML/MIN/1.73M*2
ERYTHROCYTE [DISTWIDTH] IN BLOOD BY AUTOMATED COUNT: 14 % (ref 11.5–14.5)
GLUCOSE SERPL-MCNC: 117 MG/DL (ref 74–99)
HCT VFR BLD AUTO: 41 % (ref 36–46)
HGB BLD-MCNC: 11.8 G/DL (ref 12–16)
HOLD SPECIMEN: NORMAL
MAGNESIUM SERPL-MCNC: 1.71 MG/DL (ref 1.6–2.4)
MCH RBC QN AUTO: 26.3 PG (ref 26–34)
MCHC RBC AUTO-ENTMCNC: 28.8 G/DL (ref 32–36)
MCV RBC AUTO: 91 FL (ref 80–100)
NRBC BLD-RTO: 0 /100 WBCS (ref 0–0)
PHOSPHATE SERPL-MCNC: 4 MG/DL (ref 2.5–4.9)
PLATELET # BLD AUTO: 439 X10*3/UL (ref 150–450)
POTASSIUM SERPL-SCNC: 4.2 MMOL/L (ref 3.5–5.3)
PROT UR-ACNC: 10 MG/DL (ref 5–24)
PROT/CREAT UR: 0.08 MG/MG CREAT (ref 0–0.17)
RBC # BLD AUTO: 4.49 X10*6/UL (ref 4–5.2)
SODIUM SERPL-SCNC: 139 MMOL/L (ref 136–145)
WBC # BLD AUTO: 6.5 X10*3/UL (ref 4.4–11.3)

## 2025-08-09 PROCEDURE — 82570 ASSAY OF URINE CREATININE: CPT

## 2025-08-09 PROCEDURE — 80197 ASSAY OF TACROLIMUS: CPT

## 2025-08-09 PROCEDURE — 80069 RENAL FUNCTION PANEL: CPT

## 2025-08-09 PROCEDURE — 84156 ASSAY OF PROTEIN URINE: CPT

## 2025-08-09 PROCEDURE — 83735 ASSAY OF MAGNESIUM: CPT

## 2025-08-09 PROCEDURE — 85027 COMPLETE CBC AUTOMATED: CPT

## 2025-08-10 LAB
ATRIAL RATE: 72 BPM
P AXIS: 2 DEGREES
P OFFSET: 196 MS
P ONSET: 137 MS
PR INTERVAL: 170 MS
Q ONSET: 222 MS
QRS COUNT: 12 BEATS
QRS DURATION: 70 MS
QT INTERVAL: 362 MS
QTC CALCULATION(BAZETT): 396 MS
QTC FREDERICIA: 384 MS
R AXIS: 2 DEGREES
T AXIS: 16 DEGREES
T OFFSET: 403 MS
TACROLIMUS BLD-MCNC: 8.3 NG/ML
VENTRICULAR RATE: 72 BPM

## 2025-08-11 LAB — HOLD SPECIMEN: NORMAL

## 2025-08-13 ENCOUNTER — SPECIALTY PHARMACY (OUTPATIENT)
Dept: PHARMACY | Facility: CLINIC | Age: 60
End: 2025-08-13

## 2025-08-13 PROCEDURE — RXMED WILLOW AMBULATORY MEDICATION CHARGE

## 2025-08-14 ENCOUNTER — OFFICE VISIT (OUTPATIENT)
Facility: HOSPITAL | Age: 60
End: 2025-08-14
Payer: COMMERCIAL

## 2025-08-14 VITALS
TEMPERATURE: 97.3 F | SYSTOLIC BLOOD PRESSURE: 138 MMHG | HEART RATE: 93 BPM | DIASTOLIC BLOOD PRESSURE: 87 MMHG | WEIGHT: 147.5 LBS | OXYGEN SATURATION: 98 % | BODY MASS INDEX: 29.79 KG/M2

## 2025-08-14 DIAGNOSIS — Z79.899 IMMUNOSUPPRESSIVE MANAGEMENT ENCOUNTER FOLLOWING KIDNEY TRANSPLANT: Primary | ICD-10-CM

## 2025-08-14 DIAGNOSIS — Z94.0 IMMUNOSUPPRESSIVE MANAGEMENT ENCOUNTER FOLLOWING KIDNEY TRANSPLANT: Primary | ICD-10-CM

## 2025-08-14 DIAGNOSIS — E55.9 VITAMIN D DEFICIENCY: ICD-10-CM

## 2025-08-14 DIAGNOSIS — Z94.0 KIDNEY REPLACED BY TRANSPLANT (HHS-HCC): ICD-10-CM

## 2025-08-14 DIAGNOSIS — I10 ESSENTIAL HYPERTENSION: ICD-10-CM

## 2025-08-14 PROCEDURE — 99214 OFFICE O/P EST MOD 30 MIN: CPT

## 2025-08-14 PROCEDURE — 3079F DIAST BP 80-89 MM HG: CPT

## 2025-08-14 PROCEDURE — 3075F SYST BP GE 130 - 139MM HG: CPT

## 2025-08-14 RX ORDER — LANOLIN ALCOHOL/MO/W.PET/CERES
1 CREAM (GRAM) TOPICAL 2 TIMES DAILY
Qty: 180 TABLET | Refills: 3 | Status: SHIPPED | OUTPATIENT
Start: 2025-08-14 | End: 2026-08-14

## 2025-08-14 ASSESSMENT — LIFESTYLE VARIABLES
HOW OFTEN DO YOU HAVE A DRINK CONTAINING ALCOHOL: NEVER
HOW OFTEN DO YOU HAVE SIX OR MORE DRINKS ON ONE OCCASION: NEVER
SKIP TO QUESTIONS 9-10: 1
AUDIT-C TOTAL SCORE: 0
HOW MANY STANDARD DRINKS CONTAINING ALCOHOL DO YOU HAVE ON A TYPICAL DAY: PATIENT DOES NOT DRINK

## 2025-08-14 ASSESSMENT — ENCOUNTER SYMPTOMS
LOSS OF SENSATION IN FEET: 0
DEPRESSION: 0
OCCASIONAL FEELINGS OF UNSTEADINESS: 0

## 2025-08-16 ENCOUNTER — PHARMACY VISIT (OUTPATIENT)
Dept: PHARMACY | Facility: CLINIC | Age: 60
End: 2025-08-16
Payer: COMMERCIAL

## 2025-08-25 ENCOUNTER — LAB (OUTPATIENT)
Dept: LAB | Facility: HOSPITAL | Age: 60
End: 2025-08-25
Payer: COMMERCIAL

## 2025-08-25 LAB — TACROLIMUS BLD-MCNC: 7.3 NG/ML

## 2025-10-09 ENCOUNTER — APPOINTMENT (OUTPATIENT)
Dept: OPHTHALMOLOGY | Facility: CLINIC | Age: 60
End: 2025-10-09
Payer: COMMERCIAL

## 2025-10-22 ENCOUNTER — APPOINTMENT (OUTPATIENT)
Dept: DERMATOLOGY | Facility: CLINIC | Age: 60
End: 2025-10-22
Payer: COMMERCIAL

## 2025-11-06 ENCOUNTER — APPOINTMENT (OUTPATIENT)
Dept: OPHTHALMOLOGY | Facility: CLINIC | Age: 60
End: 2025-11-06
Payer: COMMERCIAL

## 2026-01-28 ENCOUNTER — APPOINTMENT (OUTPATIENT)
Dept: OBSTETRICS AND GYNECOLOGY | Facility: CLINIC | Age: 61
End: 2026-01-28
Payer: COMMERCIAL